# Patient Record
Sex: FEMALE | NOT HISPANIC OR LATINO | Employment: FULL TIME | URBAN - METROPOLITAN AREA
[De-identification: names, ages, dates, MRNs, and addresses within clinical notes are randomized per-mention and may not be internally consistent; named-entity substitution may affect disease eponyms.]

---

## 2023-02-25 PROBLEM — E55.9 VITAMIN D DEFICIENCY: Status: ACTIVE | Noted: 2023-02-25

## 2023-02-25 RX ORDER — ERGOCALCIFEROL 1.25 MG/1
1 CAPSULE ORAL
COMMUNITY
Start: 2022-05-25 | End: 2023-08-28 | Stop reason: SDUPTHER

## 2023-03-17 ENCOUNTER — OFFICE VISIT (OUTPATIENT)
Dept: PRIMARY CARE | Facility: CLINIC | Age: 48
End: 2023-03-17
Payer: COMMERCIAL

## 2023-03-17 VITALS
OXYGEN SATURATION: 96 % | HEART RATE: 91 BPM | WEIGHT: 231 LBS | DIASTOLIC BLOOD PRESSURE: 80 MMHG | RESPIRATION RATE: 16 BRPM | BODY MASS INDEX: 39.44 KG/M2 | HEIGHT: 64 IN | SYSTOLIC BLOOD PRESSURE: 127 MMHG

## 2023-03-17 DIAGNOSIS — N63.21 MASS OF UPPER OUTER QUADRANT OF LEFT BREAST: Primary | ICD-10-CM

## 2023-03-17 DIAGNOSIS — J01.00 ACUTE NON-RECURRENT MAXILLARY SINUSITIS: ICD-10-CM

## 2023-03-17 PROCEDURE — 1036F TOBACCO NON-USER: CPT | Performed by: FAMILY MEDICINE

## 2023-03-17 PROCEDURE — 99214 OFFICE O/P EST MOD 30 MIN: CPT | Performed by: FAMILY MEDICINE

## 2023-03-17 RX ORDER — AZITHROMYCIN 250 MG/1
TABLET, FILM COATED ORAL
Qty: 6 TABLET | Refills: 0 | Status: SHIPPED | OUTPATIENT
Start: 2023-03-17

## 2023-03-17 ASSESSMENT — ENCOUNTER SYMPTOMS
SINUS PAIN: 1
CHILLS: 0
SINUS PRESSURE: 1
COUGH: 0
FEVER: 0

## 2023-03-17 ASSESSMENT — PATIENT HEALTH QUESTIONNAIRE - PHQ9
2. FEELING DOWN, DEPRESSED OR HOPELESS: NOT AT ALL
SUM OF ALL RESPONSES TO PHQ9 QUESTIONS 1 AND 2: 0
1. LITTLE INTEREST OR PLEASURE IN DOING THINGS: NOT AT ALL

## 2023-03-17 ASSESSMENT — PAIN SCALES - GENERAL: PAINLEVEL: 0-NO PAIN

## 2023-03-17 NOTE — PROGRESS NOTES
"Subjective   Patient ID: Génesis Asencio is a 47 y.o. female who presents for Breast Problem (Left left side).    Breast Problem     Pt noticed lump on left breast in last month or so.  Not sure how long it has been there as she does not routinely check.  It has not changed size.  Not painful.  No trauma to the area.  Pt has not had a mammogram in the past.    Feels like she has a sinus infection.  Pressure, headache and drainage.     Review of Systems   Constitutional:  Negative for chills and fever.   HENT:  Positive for sinus pressure and sinus pain.    Respiratory:  Negative for cough.    Skin:         +breast lump       Objective   /80 (BP Location: Right arm, Patient Position: Sitting, BP Cuff Size: Large adult)   Pulse 91   Resp 16   Ht 1.626 m (5' 4\")   Wt 105 kg (231 lb)   SpO2 96%   BMI 39.65 kg/m²     Physical Exam  Constitutional:       Appearance: Normal appearance.   HENT:      Head: Normocephalic and atraumatic.      Right Ear: Tympanic membrane normal.      Left Ear: Tympanic membrane normal.      Nose: Nose normal.      Mouth/Throat:      Mouth: Mucous membranes are dry.   Eyes:      Pupils: Pupils are equal, round, and reactive to light.   Cardiovascular:      Rate and Rhythm: Normal rate and regular rhythm.   Pulmonary:      Effort: Pulmonary effort is normal.      Breath sounds: Normal breath sounds.   Neurological:      Mental Status: She is alert.     Breast:  Left breast there is a firm, nonmobile mass noted on the upper outer quadrant around 2oclock, measures about 2cm, no overlying skin abnormality    Assessment/Plan   Problem List Items Addressed This Visit          Infectious/Inflammatory    Acute non-recurrent maxillary sinusitis     Start Zpack  Recommend symptomatic treatment including increased hydration, regular nasal saline, use of analgesics such as acetaminophen/ibuprofen and nasal saline  Follow up if symptoms fail to improve          Relevant Medications    " azithromycin (Zithromax Z-Gilberto) 250 mg tablet       Other    Mass of upper outer quadrant of left breast - Primary     Concerning mass noted on exam  Orders for STAT mammogram, ultrasound placed  Referral to breast specialist also placed  Emphasized to pt importance of following up with the imaging study  Will follow up closely         Relevant Orders    BI mammo bilateral screening tomosynthesis    BI US breast complete left    Referral to Breast Surgery    BI mammo left diagnostic

## 2023-03-17 NOTE — ASSESSMENT & PLAN NOTE
Concerning mass noted on exam  Orders for STAT mammogram, ultrasound placed  Referral to breast specialist also placed  Emphasized to pt importance of following up with the imaging study  Will follow up closely

## 2023-03-17 NOTE — ASSESSMENT & PLAN NOTE
Start Zpack  Recommend symptomatic treatment including increased hydration, regular nasal saline, use of analgesics such as acetaminophen/ibuprofen and nasal saline  Follow up if symptoms fail to improve

## 2023-07-07 ENCOUNTER — APPOINTMENT (OUTPATIENT)
Dept: PRIMARY CARE | Facility: CLINIC | Age: 48
End: 2023-07-07
Payer: COMMERCIAL

## 2023-08-04 ENCOUNTER — APPOINTMENT (OUTPATIENT)
Dept: PRIMARY CARE | Facility: CLINIC | Age: 48
End: 2023-08-04
Payer: COMMERCIAL

## 2023-08-24 ASSESSMENT — PROMIS GLOBAL HEALTH SCALE
RATE_AVERAGE_PAIN: 4
CARRYOUT_PHYSICAL_ACTIVITIES: MOSTLY
CARRYOUT_SOCIAL_ACTIVITIES: GOOD
EMOTIONAL_PROBLEMS: SOMETIMES
RATE_AVERAGE_FATIGUE: SEVERE
RATE_MENTAL_HEALTH: GOOD
RATE_GENERAL_HEALTH: FAIR
RATE_SOCIAL_SATISFACTION: GOOD
RATE_PHYSICAL_HEALTH: FAIR
RATE_QUALITY_OF_LIFE: FAIR

## 2023-08-25 ENCOUNTER — OFFICE VISIT (OUTPATIENT)
Dept: PRIMARY CARE | Facility: CLINIC | Age: 48
End: 2023-08-25
Payer: COMMERCIAL

## 2023-08-25 VITALS
HEIGHT: 64 IN | OXYGEN SATURATION: 98 % | SYSTOLIC BLOOD PRESSURE: 124 MMHG | BODY MASS INDEX: 41.18 KG/M2 | TEMPERATURE: 98 F | WEIGHT: 241.2 LBS | DIASTOLIC BLOOD PRESSURE: 82 MMHG | HEART RATE: 86 BPM | RESPIRATION RATE: 18 BRPM

## 2023-08-25 DIAGNOSIS — Z00.00 HEALTH MAINTENANCE EXAMINATION: Primary | ICD-10-CM

## 2023-08-25 DIAGNOSIS — N63.21 MASS OF UPPER OUTER QUADRANT OF LEFT BREAST: ICD-10-CM

## 2023-08-25 LAB
ALANINE AMINOTRANSFERASE (SGPT) (U/L) IN SER/PLAS: 23 U/L (ref 7–45)
ALBUMIN (G/DL) IN SER/PLAS: 4.4 G/DL (ref 3.4–5)
ALKALINE PHOSPHATASE (U/L) IN SER/PLAS: 98 U/L (ref 33–110)
ANION GAP IN SER/PLAS: 15 MMOL/L (ref 10–20)
ASPARTATE AMINOTRANSFERASE (SGOT) (U/L) IN SER/PLAS: 14 U/L (ref 9–39)
BILIRUBIN TOTAL (MG/DL) IN SER/PLAS: 0.5 MG/DL (ref 0–1.2)
CALCIUM (MG/DL) IN SER/PLAS: 9.6 MG/DL (ref 8.6–10.6)
CARBON DIOXIDE, TOTAL (MMOL/L) IN SER/PLAS: 23 MMOL/L (ref 21–32)
CHLORIDE (MMOL/L) IN SER/PLAS: 104 MMOL/L (ref 98–107)
CHOLESTEROL (MG/DL) IN SER/PLAS: 190 MG/DL (ref 0–199)
CHOLESTEROL IN HDL (MG/DL) IN SER/PLAS: 41 MG/DL
CHOLESTEROL/HDL RATIO: 4.6
CREATININE (MG/DL) IN SER/PLAS: 0.51 MG/DL (ref 0.5–1.05)
ERYTHROCYTE DISTRIBUTION WIDTH (RATIO) BY AUTOMATED COUNT: 14.7 % (ref 11.5–14.5)
ERYTHROCYTE MEAN CORPUSCULAR HEMOGLOBIN CONCENTRATION (G/DL) BY AUTOMATED: 32.1 G/DL (ref 32–36)
ERYTHROCYTE MEAN CORPUSCULAR VOLUME (FL) BY AUTOMATED COUNT: 87 FL (ref 80–100)
ERYTHROCYTES (10*6/UL) IN BLOOD BY AUTOMATED COUNT: 4.53 X10E12/L (ref 4–5.2)
GFR FEMALE: >90 ML/MIN/1.73M2
GLUCOSE (MG/DL) IN SER/PLAS: 97 MG/DL (ref 74–99)
HEMATOCRIT (%) IN BLOOD BY AUTOMATED COUNT: 39.3 % (ref 36–46)
HEMOGLOBIN (G/DL) IN BLOOD: 12.6 G/DL (ref 12–16)
LDL: 132 MG/DL (ref 0–99)
LEUKOCYTES (10*3/UL) IN BLOOD BY AUTOMATED COUNT: 11.3 X10E9/L (ref 4.4–11.3)
NRBC (PER 100 WBCS) BY AUTOMATED COUNT: 0 /100 WBC (ref 0–0)
PLATELETS (10*3/UL) IN BLOOD AUTOMATED COUNT: 248 X10E9/L (ref 150–450)
POTASSIUM (MMOL/L) IN SER/PLAS: 3.9 MMOL/L (ref 3.5–5.3)
PROTEIN TOTAL: 7.2 G/DL (ref 6.4–8.2)
SODIUM (MMOL/L) IN SER/PLAS: 138 MMOL/L (ref 136–145)
TRIGLYCERIDE (MG/DL) IN SER/PLAS: 83 MG/DL (ref 0–149)
UREA NITROGEN (MG/DL) IN SER/PLAS: 10 MG/DL (ref 6–23)
VLDL: 17 MG/DL (ref 0–40)

## 2023-08-25 PROCEDURE — 80061 LIPID PANEL: CPT

## 2023-08-25 PROCEDURE — 85027 COMPLETE CBC AUTOMATED: CPT

## 2023-08-25 PROCEDURE — 82306 VITAMIN D 25 HYDROXY: CPT

## 2023-08-25 PROCEDURE — 1036F TOBACCO NON-USER: CPT | Performed by: FAMILY MEDICINE

## 2023-08-25 PROCEDURE — 99396 PREV VISIT EST AGE 40-64: CPT | Performed by: FAMILY MEDICINE

## 2023-08-25 PROCEDURE — 80053 COMPREHEN METABOLIC PANEL: CPT

## 2023-08-25 NOTE — PROGRESS NOTES
"Reason for Visit: Annual Physical Exam    HPI:    Presents for CPE, doing well, has no concerns  Did have a breast cyst about 4 months ago, states that it has resolved since that time, declines breast specialist follow up  Has no concerns  Experiences     Active Problem List  Patient Active Problem List   Diagnosis    Vitamin D deficiency    Mass of upper outer quadrant of left breast    Health maintenance examination       Comprehensive Medical/Surgical/Social/Family History  Past Medical History:   Diagnosis Date    Acute non-recurrent maxillary sinusitis 03/17/2023     No past surgical history on file.  Social History     Tobacco Use    Smoking status: Never     Passive exposure: Never    Smokeless tobacco: Never   Substance Use Topics    Alcohol use: Never    Drug use: Never     Family History   Problem Relation Name Age of Onset    Esophageal cancer Mother      Colon cancer Father      Colon cancer Maternal Grandmother      Diabetes Maternal Grandfather           Allergies and Medications  Patient has no known allergies.  Current Outpatient Medications on File Prior to Visit   Medication Sig Dispense Refill    azithromycin (Zithromax Z-Gilberto) 250 mg tablet Take 2 tablets on day 1, tablet 1 tablet day 2-5 (Patient not taking: Reported on 8/25/2023) 6 tablet 0    ergocalciferol (Vitamin D-2) 1.25 MG (62153 UT) capsule Take 1 capsule (1,250 mcg) by mouth 1 (one) time per week.       No current facility-administered medications on file prior to visit.         ROS otherwise negative aside from what was mentioned above in HPI.    Vitals  /82 (BP Location: Right arm, Patient Position: Sitting)   Pulse 86   Temp 36.7 °C (98 °F) (Oral)   Resp 18   Ht 1.626 m (5' 4.02\")   Wt 109 kg (241 lb 3.2 oz)   SpO2 98%   BMI 41.38 kg/m²   Body mass index is 41.38 kg/m².  Physical Exam  Gen: Alert, NAD  HEENT:  PERRL, EOMI, conjunctiva and sclera normal in appearance.   Neck:  Supple with FROM; No masses/nodes palpable; " Thyroid nontender and without nodules; No RHONDA  Respiratory:  Lungs CTAB  Cardiovascular:  Heart RRR. No M/R/G. Peripheral pulses equal bilaterally  Abdomen:  Soft, nontender, BS present throughout; No R/G/R; No HSM or masses palpated  Extremities:  FROM all extremities; Muscle strength grossly normal with good tone  Neuro:  CN II-XII intact; Gross motor and sensory intact  Skin:  No suspicious lesions present    Assessment and Plan:  Problem List Items Addressed This Visit       Mass of upper outer quadrant of left breast    Current Assessment & Plan     Normal cyst on imaging  Continue to encourage pt follow up with breast specialist but she declines  Emphasized importance of obtaining repeat follow up examination with mammogram         Health maintenance examination - Primary    Current Assessment & Plan     Recommended screening guidelines addressed and orders placed as indicated by age and chronic conditions  Screening labs ordered, will call with results  Encourage patient to get colon cancer screening, pt declines, aware of risks of foregoing screening  Continue to work on healthy lifestyle including well balanced diet, regular activity, limit alcohol, no tobacco products and safe sexual practices  Follow up annually           Relevant Orders    CBC (Completed)    Comprehensive Metabolic Panel (Completed)    Lipid Panel (Completed)    Vitamin D, Total (Completed)         Genia Ramachandran MD

## 2023-08-26 PROBLEM — Z00.00 HEALTH MAINTENANCE EXAMINATION: Status: ACTIVE | Noted: 2023-08-26

## 2023-08-26 PROBLEM — J01.00 ACUTE NON-RECURRENT MAXILLARY SINUSITIS: Status: RESOLVED | Noted: 2023-03-17 | Resolved: 2023-08-26

## 2023-08-26 LAB — CALCIDIOL (25 OH VITAMIN D3) (NG/ML) IN SER/PLAS: 9 NG/ML

## 2023-08-26 NOTE — ASSESSMENT & PLAN NOTE
Normal cyst on imaging  Continue to encourage pt follow up with breast specialist but she declines  Emphasized importance of obtaining repeat follow up examination with mammogram

## 2023-08-26 NOTE — ASSESSMENT & PLAN NOTE
Recommended screening guidelines addressed and orders placed as indicated by age and chronic conditions  Screening labs ordered, will call with results  Encourage patient to get colon cancer screening, pt declines, aware of risks of foregoing screening  Continue to work on healthy lifestyle including well balanced diet, regular activity, limit alcohol, no tobacco products and safe sexual practices  Follow up annually

## 2023-08-28 DIAGNOSIS — E55.9 VITAMIN D DEFICIENCY: Primary | ICD-10-CM

## 2023-08-28 RX ORDER — ERGOCALCIFEROL 1.25 MG/1
1 CAPSULE ORAL
Qty: 8 CAPSULE | Refills: 1 | Status: SHIPPED | OUTPATIENT
Start: 2023-08-28

## 2024-03-07 ENCOUNTER — HOSPITAL ENCOUNTER (OUTPATIENT)
Dept: RADIOLOGY | Facility: CLINIC | Age: 49
Discharge: HOME | End: 2024-03-07
Payer: COMMERCIAL

## 2024-03-07 ENCOUNTER — OFFICE VISIT (OUTPATIENT)
Dept: ORTHOPEDIC SURGERY | Facility: CLINIC | Age: 49
End: 2024-03-07
Payer: COMMERCIAL

## 2024-03-07 DIAGNOSIS — M25.561 ACUTE PAIN OF RIGHT KNEE: ICD-10-CM

## 2024-03-07 DIAGNOSIS — M17.11 ARTHRITIS OF RIGHT KNEE: ICD-10-CM

## 2024-03-07 DIAGNOSIS — M17.11 ARTHRITIS OF RIGHT KNEE: Primary | ICD-10-CM

## 2024-03-07 DIAGNOSIS — M25.561 RIGHT KNEE PAIN: ICD-10-CM

## 2024-03-07 PROCEDURE — L1812 KO ELASTIC W/JOINTS PRE OTS: HCPCS

## 2024-03-07 PROCEDURE — 73562 X-RAY EXAM OF KNEE 3: CPT | Mod: RT

## 2024-03-07 PROCEDURE — 20610 DRAIN/INJ JOINT/BURSA W/O US: CPT

## 2024-03-07 PROCEDURE — 99203 OFFICE O/P NEW LOW 30 MIN: CPT

## 2024-03-07 PROCEDURE — 1036F TOBACCO NON-USER: CPT

## 2024-03-07 RX ORDER — TRIAMCINOLONE ACETONIDE 40 MG/ML
40 INJECTION, SUSPENSION INTRA-ARTICULAR; INTRAMUSCULAR
Status: COMPLETED | OUTPATIENT
Start: 2024-03-07 | End: 2024-03-07

## 2024-03-07 RX ORDER — LIDOCAINE HYDROCHLORIDE 20 MG/ML
2 INJECTION, SOLUTION INFILTRATION; PERINEURAL
Status: COMPLETED | OUTPATIENT
Start: 2024-03-07 | End: 2024-03-07

## 2024-03-07 RX ADMIN — LIDOCAINE HYDROCHLORIDE 2 ML: 20 INJECTION, SOLUTION INFILTRATION; PERINEURAL at 12:12

## 2024-03-07 RX ADMIN — TRIAMCINOLONE ACETONIDE 40 MG: 40 INJECTION, SUSPENSION INTRA-ARTICULAR; INTRAMUSCULAR at 12:12

## 2024-03-07 NOTE — PROGRESS NOTES
Subjective    Patient ID: Génesis Asencio is a 48 y.o. female.    Chief Complaint: Pain of the Right Knee    HPI  This is a pleasant 48-year-old female presenting to the walk-in injury clinic for evaluation of right knee pain, which has been ongoing for 6 weeks.  No known injury.  She points to anterior aspect of knee and medial aspect when describing the pain.  Pain is described as a constant dull ache, worse with activities of daily living including standing walking and stair climbing.  She experiences pain and stiffness after prolonged sitting.  It is difficult for her to kneel and get up from a sitting position.  She has noticed limited range of motion, cracking/popping, and occasional mechanical symptoms of knee giving out or buckling.  The pain awakens her at night.  Has been limping due to the pain.  She has not noted any redness or warmth.  She takes an occasional Aleve without significant relief.  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    The patient's past medical, surgical, family, and social history as well as allergies and medications were reviewed and updated in the chart.    Objective   Ortho Exam  Pleasant and no acute distress. Walks with antalgic gait. Stands with varus alignment of both knees.  Bilateral knees appearing without soft tissue swelling erythema or ecchymosis.  There is no warmth upon touch.  Patellofemoral crepitus noted with range of motion testing.  Right knee range of motion is 3-110°. The knee is stable to varus and valgus stress Lachman and posterior drawer.  Art's is positive with pain medially.  There is a mild effusion. There is generalized tenderness, but most focal to medial joint line. Left knee range of motion is 3-120°. There is no effusion. The knee is stable to varus and valgus stress Lachman and posterior drawer. Both lower extremities are well perfused the skin is intact and muscle tone is adequate.    Image Results:  Multiple view  x-rays of the right knee obtained today personally reviewed, without evidence of acute fracture or dislocation.  There is evidence of mild to moderate degenerative changes of both knees, there is joint space narrowing in medial and patellofemoral compartments.    L Inj/Asp: R knee on 3/7/2024 12:12 PM  Indications: pain  Details: 22 G needle, superolateral approach  Medications: 40 mg triamcinolone acetonide 40 mg/mL; 2 mL lidocaine 20 mg/mL (2 %)  Procedure, treatment alternatives, risks and benefits explained, specific risks discussed. Consent was given by the patient.          Assessment/Plan   Encounter Diagnoses: Right knee pain, right knee arthritis, concern for degenerative medial meniscus tear    Plan: Discussion with patient about diagnosis with review of today's x-rays.  Discussion about right knee arthritis was done.  Conservative treatment options were discussed at length.  After the risks and benefits of a right knee intra-articular steroid injection of Kenalog/lidocaine were discussed, patient agreed to injection and tolerated well.  She is aware that injection could take up to 2 weeks to take full effect.  She can continue to take anti-inflammatories and use ice application.  I have prescribed her an economy hinged brace to help with right knee stability.  She should avoid aggravating activities.  If she does not see significant relief of symptoms in 4 to 6 weeks, an MRI of the right knee could be indicated to assess for a medial meniscus tear.  She will follow-up as needed.

## 2024-03-25 DIAGNOSIS — M25.561 ACUTE PAIN OF RIGHT KNEE: Primary | ICD-10-CM

## 2024-04-04 ENCOUNTER — TELEPHONE (OUTPATIENT)
Dept: ORTHOPEDIC SURGERY | Facility: CLINIC | Age: 49
End: 2024-04-04
Payer: COMMERCIAL

## 2024-04-09 ENCOUNTER — APPOINTMENT (OUTPATIENT)
Dept: RADIOLOGY | Facility: CLINIC | Age: 49
End: 2024-04-09
Payer: COMMERCIAL

## 2024-04-10 NOTE — TELEPHONE ENCOUNTER
PLACED CALL TO PT AND GAVE HER THE RESPONSE. SHE STATED THE INJECTION HELPED AND IS GOING TO SEE HOW IT LASTS.

## 2024-04-19 ENCOUNTER — TELEPHONE (OUTPATIENT)
Dept: ORTHOPEDIC SURGERY | Facility: CLINIC | Age: 49
End: 2024-04-19
Payer: COMMERCIAL

## 2024-04-19 NOTE — TELEPHONE ENCOUNTER
Left  a voicemail to remind the pt. to please bring her MRI disc to her appointment with Dr. Wong and to call back if there were any questions.

## 2024-04-26 ENCOUNTER — APPOINTMENT (OUTPATIENT)
Dept: ORTHOPEDIC SURGERY | Facility: CLINIC | Age: 49
End: 2024-04-26
Payer: COMMERCIAL

## 2024-04-26 ENCOUNTER — OFFICE VISIT (OUTPATIENT)
Dept: ORTHOPEDIC SURGERY | Facility: CLINIC | Age: 49
End: 2024-04-26
Payer: COMMERCIAL

## 2024-04-26 DIAGNOSIS — S83.231A COMPLEX TEAR OF MEDIAL MENISCUS OF RIGHT KNEE AS CURRENT INJURY, INITIAL ENCOUNTER: Primary | ICD-10-CM

## 2024-04-26 PROCEDURE — 1036F TOBACCO NON-USER: CPT | Performed by: ORTHOPAEDIC SURGERY

## 2024-04-26 PROCEDURE — 99214 OFFICE O/P EST MOD 30 MIN: CPT | Performed by: ORTHOPAEDIC SURGERY

## 2024-04-26 RX ORDER — SODIUM CHLORIDE, SODIUM LACTATE, POTASSIUM CHLORIDE, CALCIUM CHLORIDE 600; 310; 30; 20 MG/100ML; MG/100ML; MG/100ML; MG/100ML
100 INJECTION, SOLUTION INTRAVENOUS CONTINUOUS
OUTPATIENT
Start: 2024-04-26

## 2024-04-26 RX ORDER — CEFAZOLIN SODIUM 2 G/100ML
2 INJECTION, SOLUTION INTRAVENOUS ONCE
OUTPATIENT
Start: 2024-04-26 | End: 2024-04-26

## 2024-04-26 NOTE — PROGRESS NOTES
48-year-old is seen with right knee pain.  She has been having intermittent severe sharp shooting pain in the right knee.  Pain is worse with standing and walking.  There is locking and swelling and giving way.  She has been having pain since January.  She had an injection that gave her some relief.  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    Pleasant and in no acute distress. Ambulates with a mildly antalgic gait.  Right knee range of motion is 3-120. There is a mild effusion and no instability. The knee is stable to varus and valgus stress Lachman and posterior drawer. There is medial joint line tenderness Art's is positive with pain medially.  The Left knee range of motion is 0-130 without effusion or instability. There is no tenderness around the left knee. Bilateral lower extremities are well-perfused the skin is intact and muscle tone is adequate.  There is adequate range of motion of his hips without significant pain.    Multiple x-ray views of the right knee are personally reviewed and there is mild medial compartment narrowing.    MRI of the right knee is personally reviewed and there is complex tearing involving the posterior horn of the medial meniscus.  There are chondral changes involving the medial and patellofemoral compartments.    A detailed discussion about her knee and the meniscus tear was done.  Treatment options including no treatment were reviewed.  Realistic expectations and limitations of arthroscopy were discussed.  Decision was made to proceed with right knee arthroscopy and partial medial meniscectomy.  The surgery and postoperative course reviewed in detail.  Risks including but not limited to infection thromboembolus neurovascular injury medical problems and stiffness were reviewed and she understands this and has elected to proceed.

## 2024-05-06 ENCOUNTER — HOSPITAL ENCOUNTER (OUTPATIENT)
Dept: RADIOLOGY | Facility: EXTERNAL LOCATION | Age: 49
Discharge: HOME | End: 2024-05-06
Payer: COMMERCIAL

## 2024-06-05 PROBLEM — S83.209A CURRENT TEAR OF SEMILUNAR CARTILAGE: Status: ACTIVE | Noted: 2024-04-26

## 2024-06-26 ENCOUNTER — PRE-ADMISSION TESTING (OUTPATIENT)
Dept: PREADMISSION TESTING | Facility: HOSPITAL | Age: 49
End: 2024-06-26
Payer: COMMERCIAL

## 2024-06-26 VITALS
OXYGEN SATURATION: 98 % | WEIGHT: 242.51 LBS | BODY MASS INDEX: 41.4 KG/M2 | HEART RATE: 84 BPM | RESPIRATION RATE: 16 BRPM | SYSTOLIC BLOOD PRESSURE: 134 MMHG | TEMPERATURE: 96.3 F | HEIGHT: 64 IN | DIASTOLIC BLOOD PRESSURE: 84 MMHG

## 2024-06-26 DIAGNOSIS — Z01.818 PREOP TESTING: Primary | ICD-10-CM

## 2024-06-26 LAB
ANION GAP SERPL CALC-SCNC: 15 MMOL/L (ref 10–20)
ATRIAL RATE: 70 BPM
BUN SERPL-MCNC: 8 MG/DL (ref 6–23)
CALCIUM SERPL-MCNC: 9.2 MG/DL (ref 8.6–10.3)
CHLORIDE SERPL-SCNC: 105 MMOL/L (ref 98–107)
CO2 SERPL-SCNC: 23 MMOL/L (ref 21–32)
CREAT SERPL-MCNC: 0.6 MG/DL (ref 0.5–1.05)
EGFRCR SERPLBLD CKD-EPI 2021: >90 ML/MIN/1.73M*2
ERYTHROCYTE [DISTWIDTH] IN BLOOD BY AUTOMATED COUNT: 14.5 % (ref 11.5–14.5)
EST. AVERAGE GLUCOSE BLD GHB EST-MCNC: 114 MG/DL
GLUCOSE SERPL-MCNC: 127 MG/DL (ref 74–99)
HBA1C MFR BLD: 5.6 %
HCT VFR BLD AUTO: 40.1 % (ref 36–46)
HGB BLD-MCNC: 13.3 G/DL (ref 12–16)
MCH RBC QN AUTO: 28.1 PG (ref 26–34)
MCHC RBC AUTO-ENTMCNC: 33.2 G/DL (ref 32–36)
MCV RBC AUTO: 85 FL (ref 80–100)
NRBC BLD-RTO: 0 /100 WBCS (ref 0–0)
P AXIS: 22 DEGREES
P OFFSET: 193 MS
P ONSET: 143 MS
PLATELET # BLD AUTO: 281 X10*3/UL (ref 150–450)
POTASSIUM SERPL-SCNC: 3.5 MMOL/L (ref 3.5–5.3)
PR INTERVAL: 170 MS
Q ONSET: 228 MS
QRS COUNT: 12 BEATS
QRS DURATION: 90 MS
QT INTERVAL: 386 MS
QTC CALCULATION(BAZETT): 416 MS
QTC FREDERICIA: 406 MS
R AXIS: -1 DEGREES
RBC # BLD AUTO: 4.73 X10*6/UL (ref 4–5.2)
SODIUM SERPL-SCNC: 139 MMOL/L (ref 136–145)
T AXIS: 6 DEGREES
T OFFSET: 421 MS
VENTRICULAR RATE: 70 BPM
WBC # BLD AUTO: 10 X10*3/UL (ref 4.4–11.3)

## 2024-06-26 PROCEDURE — 85027 COMPLETE CBC AUTOMATED: CPT

## 2024-06-26 PROCEDURE — 93005 ELECTROCARDIOGRAM TRACING: CPT

## 2024-06-26 PROCEDURE — 83036 HEMOGLOBIN GLYCOSYLATED A1C: CPT

## 2024-06-26 PROCEDURE — 80048 BASIC METABOLIC PNL TOTAL CA: CPT

## 2024-06-26 PROCEDURE — 36415 COLL VENOUS BLD VENIPUNCTURE: CPT

## 2024-06-26 PROCEDURE — 93010 ELECTROCARDIOGRAM REPORT: CPT | Performed by: INTERNAL MEDICINE

## 2024-06-26 ASSESSMENT — DUKE ACTIVITY SCORE INDEX (DASI)
CAN YOU DO HEAVY WORK AROUND THE HOUSE LIKE SCRUBBING FLOORS OR LIFTING AND MOVING HEAVY FURNITURE: NO
CAN YOU PARTICIPATE IN STRENOUS SPORTS LIKE SWIMMING, SINGLES TENNIS, FOOTBALL, BASKETBALL, OR SKIING: NO
CAN YOU CLIMB A FLIGHT OF STAIRS OR WALK UP A HILL: YES
CAN YOU WALK INDOORS, SUCH AS AROUND YOUR HOUSE: YES
CAN YOU TAKE CARE OF YOURSELF (EAT, DRESS, BATHE, OR USE TOILET): YES
CAN YOU PARTICIPATE IN MODERATE RECREATIONAL ACTIVITIES LIKE GOLF, BOWLING, DANCING, DOUBLES TENNIS OR THROWING A BASEBALL OR FOOTBALL: NO
CAN YOU RUN A SHORT DISTANCE: NO
TOTAL_SCORE: 24.2
CAN YOU DO YARD WORK LIKE RAKING LEAVES, WEEDING OR PUSHING A MOWER: NO
CAN YOU DO LIGHT WORK AROUND THE HOUSE LIKE DUSTING OR WASHING DISHES: YES
CAN YOU HAVE SEXUAL RELATIONS: YES
DASI METS SCORE: 5.7
CAN YOU DO MODERATE WORK AROUND THE HOUSE LIKE VACUUMING, SWEEPING FLOORS OR CARRYING GROCERIES: YES
CAN YOU WALK A BLOCK OR TWO ON LEVEL GROUND: YES

## 2024-06-26 NOTE — CPM/PAT H&P
CPM/PAT Evaluation       Name: Génesis Asencio (Génesis Asencio)  /Age: 1975/49 y.o.     In-Person       Chief Complaint: Right knee pain    49 yr old female with c/o Right knee pain.  States no specific injury, just that knee began to hurt in January of this year.  Reports ongoing progressive pain worsened by activity including walking, standing and stairs.  Pain is constant dull ache w/intermittent sharp, shooting along with swelling, knee popping and instability; denies falls.  Has tried injection, rest and elevation with no lasting relief. Currently wearing a knee hinged brace for comfort and stability.  Reports remaining otherwise physically active, denies cardiac or respiratory symptoms.  No previous general anesthesia.    Followed by PCP (Madie) - last visit 2023         Past Medical History:   Diagnosis Date    Acute non-recurrent maxillary sinusitis 2023       No past surgical history on file.    Patient  has no history on file for sexual activity.    Family History   Problem Relation Name Age of Onset    Esophageal cancer Mother      Colon cancer Father      Colon cancer Maternal Grandmother      Diabetes Maternal Grandfather         No Known Allergies    Prior to Admission medications    Medication Sig Start Date End Date Taking? Authorizing Provider   azithromycin (Zithromax Z-Gilberto) 250 mg tablet Take 2 tablets on day 1, tablet 1 tablet day 2-5  Patient not taking: Reported on 2023 3/17/23   Genia Ramachandran MD   ergocalciferol (Vitamin D-2) 1.25 MG (68950 UT) capsule Take 1 capsule (1,250 mcg) by mouth 1 (one) time per week. 23   Genia Ramachandran MD        Review of Systems    Constitutional: no fever, no chills and not feeling poorly.   Eyes: no eyesight problems.   ENT: no hearing loss, no nosebleeds and no sore throat.   Cardiovascular: no chest pain, no palpitations and no extremity edema.   Respiratory: no shortness of breath, no wheezing, no cough, + sob w/exertion.    Gastrointestinal: negative for abdominal pain, blood in stools or changes in bowel habits   Genitourinary: negative for dysuria, incontinence or changes in urinary habits   Musculoskeletal: see HPI   Integumentary: negative for lesions, rash or itching.   Neurological: negative for confusion, dizziness or fainting.   Psychiatric: not suicidal, no anxiety and no depression.   All other systems have been reviewed and are negative for complaint.     Physical Exam  Constitutional:       General: No acute distress.     Aox3, pleasant and cooperative, appropriate mood and eye contact   HENT:      Head: Normocephalic.      Mouth/Throat: Mucous membranes moist & pink  Eyes:      Vision grossly intact, PERRLA, corrective lenses in use  Neck:      No carotid bruit, no JVD  Cardiovascular:      RRR, S1S2, no murmurs, rubs or gallops  Pulmonary:      Symmetric chest expansion, CTA, Room Air  Abdominal:      Soft non-tender, BSx4   Skin:     Warm, dry & intact   Extremities:      No gross deformities; abnormal gait, hinged knee brace to Right in use  Neurological:      No focal deficit, Aox3, MYERS x4  Psychiatric:      Pleasant & cooperative, appropriate affect    PAT AIRWAY:   Airway:     Mallampati::  II    TM distance::  >3 FB    Neck ROM::  Full   upper dentures      There were no vitals taken for this visit.    DASI Risk Score    No data to display       Caprini DVT Assessment    No data to display       Modified Frailty Index    No data to display       CHADS2 Stroke Risk  Current as of 31 minutes ago        N/A 3 to 100%: High Risk   2 to < 3%: Medium Risk   0 to < 2%: Low Risk     Last Change: N/A          This score determines the patient's risk of having a stroke if the patient has atrial fibrillation.        This score is not applicable to this patient. Components are not calculated.          Revised Cardiac Risk Index    No data to display       Apfel Simplified Score    No data to display       Risk Analysis Index  Results This Encounter    No data found in the last 1 encounters.         Assessment and Plan:     Followed by ortho, Complex tear of Right knee medial meniscus    Right knee arthroscopy w/partial medial meniscectomy w/Dr Wong on 7/11/2024    Reviewed todays ecg

## 2024-06-26 NOTE — H&P (VIEW-ONLY)
CPM/PAT Evaluation       Name: Génesis Asencio (Génesis Asencio)  /Age: 1975/49 y.o.     In-Person       Chief Complaint: Right knee pain    49 yr old female with c/o Right knee pain.  States no specific injury, just that knee began to hurt in January of this year.  Reports ongoing progressive pain worsened by activity including walking, standing and stairs.  Pain is constant dull ache w/intermittent sharp, shooting along with swelling, knee popping and instability; denies falls.  Has tried injection, rest and elevation with no lasting relief. Currently wearing a knee hinged brace for comfort and stability.  Reports remaining otherwise physically active, denies cardiac or respiratory symptoms.  No previous general anesthesia.    Followed by PCP (Madie) - last visit 2023         Past Medical History:   Diagnosis Date    Acute non-recurrent maxillary sinusitis 2023       No past surgical history on file.    Patient  has no history on file for sexual activity.    Family History   Problem Relation Name Age of Onset    Esophageal cancer Mother      Colon cancer Father      Colon cancer Maternal Grandmother      Diabetes Maternal Grandfather         No Known Allergies    Prior to Admission medications    Medication Sig Start Date End Date Taking? Authorizing Provider   azithromycin (Zithromax Z-Gilberto) 250 mg tablet Take 2 tablets on day 1, tablet 1 tablet day 2-5  Patient not taking: Reported on 2023 3/17/23   Genia Ramachandran MD   ergocalciferol (Vitamin D-2) 1.25 MG (67733 UT) capsule Take 1 capsule (1,250 mcg) by mouth 1 (one) time per week. 23   Genia Ramachandran MD        Review of Systems    Constitutional: no fever, no chills and not feeling poorly.   Eyes: no eyesight problems.   ENT: no hearing loss, no nosebleeds and no sore throat.   Cardiovascular: no chest pain, no palpitations and no extremity edema.   Respiratory: no shortness of breath, no wheezing, no cough, + sob w/exertion.    Gastrointestinal: negative for abdominal pain, blood in stools or changes in bowel habits   Genitourinary: negative for dysuria, incontinence or changes in urinary habits   Musculoskeletal: see HPI   Integumentary: negative for lesions, rash or itching.   Neurological: negative for confusion, dizziness or fainting.   Psychiatric: not suicidal, no anxiety and no depression.   All other systems have been reviewed and are negative for complaint.     Physical Exam  Constitutional:       General: No acute distress.     Aox3, pleasant and cooperative, appropriate mood and eye contact   HENT:      Head: Normocephalic.      Mouth/Throat: Mucous membranes moist & pink  Eyes:      Vision grossly intact, PERRLA, corrective lenses in use  Neck:      No carotid bruit, no JVD  Cardiovascular:      RRR, S1S2, no murmurs, rubs or gallops  Pulmonary:      Symmetric chest expansion, CTA, Room Air  Abdominal:      Soft non-tender, BSx4   Skin:     Warm, dry & intact   Extremities:      No gross deformities; abnormal gait, hinged knee brace to Right in use  Neurological:      No focal deficit, Aox3, MYERS x4  Psychiatric:      Pleasant & cooperative, appropriate affect    PAT AIRWAY:   Airway:     Mallampati::  II    TM distance::  >3 FB    Neck ROM::  Full   upper dentures      There were no vitals taken for this visit.    DASI Risk Score    No data to display       Caprini DVT Assessment    No data to display       Modified Frailty Index    No data to display       CHADS2 Stroke Risk  Current as of 31 minutes ago        N/A 3 to 100%: High Risk   2 to < 3%: Medium Risk   0 to < 2%: Low Risk     Last Change: N/A          This score determines the patient's risk of having a stroke if the patient has atrial fibrillation.        This score is not applicable to this patient. Components are not calculated.          Revised Cardiac Risk Index    No data to display       Apfel Simplified Score    No data to display       Risk Analysis Index  Results This Encounter    No data found in the last 1 encounters.         Assessment and Plan:     Followed by ortho, Complex tear of Right knee medial meniscus    Right knee arthroscopy w/partial medial meniscectomy w/Dr Wong on 7/11/2024    Reviewed todays ecg

## 2024-06-26 NOTE — PREPROCEDURE INSTRUCTIONS
Medication List            Accurate as of June 26, 2024  9:49 AM. Always use your most recent med list.                ergocalciferol 1.25 MG (12705 UT) capsule  Commonly known as: Vitamin D-2  Take 1 capsule (1,250 mcg) by mouth 1 (one) time per week.  Medication Adjustments for Surgery: Stop 7 days before surgery            One of our staff members will call you one business day before your surgery between 12-4pm to let you know the time to arrive at the hospital. If you have not received a phone call by 2pm call [phone].     When you arrive at the hospital, go to Registration on the ground floor.   You will need a responsible adult to drive you home.     Prior to surgery date:  One (1) week prior to surgery STOP:  -Stop aspirin products. Do not take NSAIDS/ Ibuprofen, Aleve, Motrin. Okay to take Tylenol or Acetaminophen. Do not take vitamins, supplements, herbals, diet pills.   -Stop these medications: __________________________________________________________  -No alcohol for 24 hours prior to surgery.  -No smoking 24 hours prior. No Marijuana, CBD oil, or Vaping 48 hours prior to surgery.  -Enjoy a light supper the evening before surgery.    Day of Surgery:  -Nothing to eat or drink after midnight. This includes food of any kind (including hard candy, cough drops, mints and gum, coffee).   -No acrylic nails or nail polish on at least one fingernail; no polish on toes for foot surgery.   -No body piercing or jewelry.   -Shower as directed. No deodorant, lotion, power, oils, perfume, make-up.   -Wear loose comfortable clothing to accommodate your bandages.       -Bring urine specimen if directed  -Bring crutches/ walker if directed           NPO Instructions:    Do not eat any food after midnight the night before your surgery/procedure.    Additional Instructions:     The Day before Surgery:  Review your medication instructions, stop indicated medications  You will be contacted regarding the time of your  arrival to facility and surgery time

## 2024-06-28 ENCOUNTER — APPOINTMENT (OUTPATIENT)
Dept: PRIMARY CARE | Facility: CLINIC | Age: 49
End: 2024-06-28
Payer: COMMERCIAL

## 2024-07-01 LAB
ATRIAL RATE: 70 BPM
P AXIS: 22 DEGREES
P OFFSET: 193 MS
P ONSET: 143 MS
PR INTERVAL: 170 MS
Q ONSET: 228 MS
QRS COUNT: 12 BEATS
QRS DURATION: 90 MS
QT INTERVAL: 386 MS
QTC CALCULATION(BAZETT): 416 MS
QTC FREDERICIA: 406 MS
R AXIS: -1 DEGREES
T AXIS: 6 DEGREES
T OFFSET: 421 MS
VENTRICULAR RATE: 70 BPM

## 2024-07-10 ENCOUNTER — ANESTHESIA EVENT (OUTPATIENT)
Dept: OPERATING ROOM | Facility: HOSPITAL | Age: 49
End: 2024-07-10
Payer: COMMERCIAL

## 2024-07-10 PROBLEM — E66.01 CLASS 3 SEVERE OBESITY IN ADULT (MULTI): Status: ACTIVE | Noted: 2024-07-10

## 2024-07-10 PROBLEM — E66.813 CLASS 3 SEVERE OBESITY IN ADULT: Status: ACTIVE | Noted: 2024-07-10

## 2024-07-10 SDOH — HEALTH STABILITY: MENTAL HEALTH: CURRENT SMOKER: 0

## 2024-07-10 NOTE — ANESTHESIA PREPROCEDURE EVALUATION
Patient: Génesis Asencio    Procedure Information       Date/Time: 07/11/24 5490    Procedure: RIGHT KNEE ARTHROSCOPY WITH PARTIAL MEDIAL MENISCECTOMY (Right: Knee)    Location: [facility] 07 / [facility]    Surgeons: Guy oWng MD            Relevant Problems   Endocrine   (+) Class 3 severe obesity in adult (Multi)       Clinical information reviewed:      Problems              NPO Detail:  No data recorded     Physical Exam    Airway  Mallampati: II  TM distance: <3 FB  Neck ROM: full     Cardiovascular - normal exam  Rhythm: regular  Rate: normal     Dental - normal exam  (+) upper dentures     Pulmonary - normal exam     Abdominal   (+) obese             Anesthesia Plan    History of general anesthesia?: no  History of complications of general anesthesia?: unknown/emergency    ASA 3     general     The patient is not a current smoker.  Education provided regarding risk of obstructive sleep apnea.  intravenous induction   Postoperative administration of opioids is intended.  Trial extubation is planned.  Anesthetic plan and risks discussed with patient.  Use of blood products discussed with patient who consented to blood products.    Plan discussed with CRNA.

## 2024-07-11 ENCOUNTER — HOSPITAL ENCOUNTER (OUTPATIENT)
Facility: HOSPITAL | Age: 49
Setting detail: OUTPATIENT SURGERY
Discharge: HOME | End: 2024-07-11
Attending: ORTHOPAEDIC SURGERY | Admitting: ORTHOPAEDIC SURGERY
Payer: COMMERCIAL

## 2024-07-11 ENCOUNTER — ANESTHESIA (OUTPATIENT)
Dept: OPERATING ROOM | Facility: HOSPITAL | Age: 49
End: 2024-07-11
Payer: COMMERCIAL

## 2024-07-11 VITALS
RESPIRATION RATE: 16 BRPM | BODY MASS INDEX: 41.63 KG/M2 | WEIGHT: 242.51 LBS | DIASTOLIC BLOOD PRESSURE: 69 MMHG | OXYGEN SATURATION: 97 % | TEMPERATURE: 97.2 F | SYSTOLIC BLOOD PRESSURE: 139 MMHG | HEART RATE: 85 BPM

## 2024-07-11 DIAGNOSIS — S83.231A COMPLEX TEAR OF MEDIAL MENISCUS, CURRENT INJURY, RIGHT KNEE, INITIAL ENCOUNTER: Primary | ICD-10-CM

## 2024-07-11 DIAGNOSIS — S83.231A COMPLEX TEAR OF MEDIAL MENISCUS OF RIGHT KNEE AS CURRENT INJURY, INITIAL ENCOUNTER: ICD-10-CM

## 2024-07-11 LAB — PREGNANCY TEST URINE, POC: NEGATIVE

## 2024-07-11 PROCEDURE — 2500000004 HC RX 250 GENERAL PHARMACY W/ HCPCS (ALT 636 FOR OP/ED): Performed by: NURSE ANESTHETIST, CERTIFIED REGISTERED

## 2024-07-11 PROCEDURE — 3600000004 HC OR TIME - INITIAL BASE CHARGE - PROCEDURE LEVEL FOUR: Performed by: ORTHOPAEDIC SURGERY

## 2024-07-11 PROCEDURE — 3700000002 HC GENERAL ANESTHESIA TIME - EACH INCREMENTAL 1 MINUTE: Performed by: ORTHOPAEDIC SURGERY

## 2024-07-11 PROCEDURE — 2500000004 HC RX 250 GENERAL PHARMACY W/ HCPCS (ALT 636 FOR OP/ED): Performed by: ORTHOPAEDIC SURGERY

## 2024-07-11 PROCEDURE — 7100000010 HC PHASE TWO TIME - EACH INCREMENTAL 1 MINUTE: Performed by: ORTHOPAEDIC SURGERY

## 2024-07-11 PROCEDURE — 3700000001 HC GENERAL ANESTHESIA TIME - INITIAL BASE CHARGE: Performed by: ORTHOPAEDIC SURGERY

## 2024-07-11 PROCEDURE — 7100000001 HC RECOVERY ROOM TIME - INITIAL BASE CHARGE: Performed by: ORTHOPAEDIC SURGERY

## 2024-07-11 PROCEDURE — 2500000005 HC RX 250 GENERAL PHARMACY W/O HCPCS: Performed by: NURSE ANESTHETIST, CERTIFIED REGISTERED

## 2024-07-11 PROCEDURE — 2500000004 HC RX 250 GENERAL PHARMACY W/ HCPCS (ALT 636 FOR OP/ED): Performed by: ANESTHESIOLOGY

## 2024-07-11 PROCEDURE — 29881 ARTHRS KNE SRG MNISECTMY M/L: CPT | Performed by: ORTHOPAEDIC SURGERY

## 2024-07-11 PROCEDURE — 7100000009 HC PHASE TWO TIME - INITIAL BASE CHARGE: Performed by: ORTHOPAEDIC SURGERY

## 2024-07-11 PROCEDURE — 2720000007 HC OR 272 NO HCPCS: Performed by: ORTHOPAEDIC SURGERY

## 2024-07-11 PROCEDURE — 81025 URINE PREGNANCY TEST: CPT | Performed by: ORTHOPAEDIC SURGERY

## 2024-07-11 PROCEDURE — 7100000002 HC RECOVERY ROOM TIME - EACH INCREMENTAL 1 MINUTE: Performed by: ORTHOPAEDIC SURGERY

## 2024-07-11 PROCEDURE — 3600000009 HC OR TIME - EACH INCREMENTAL 1 MINUTE - PROCEDURE LEVEL FOUR: Performed by: ORTHOPAEDIC SURGERY

## 2024-07-11 PROCEDURE — 2500000005 HC RX 250 GENERAL PHARMACY W/O HCPCS: Performed by: ORTHOPAEDIC SURGERY

## 2024-07-11 RX ORDER — GLYCOPYRROLATE 0.2 MG/ML
INJECTION INTRAMUSCULAR; INTRAVENOUS AS NEEDED
Status: DISCONTINUED | OUTPATIENT
Start: 2024-07-11 | End: 2024-07-11

## 2024-07-11 RX ORDER — IBUPROFEN 600 MG/1
600 TABLET ORAL EVERY 6 HOURS PRN
Qty: 60 TABLET | Refills: 0 | Status: SHIPPED | OUTPATIENT
Start: 2024-07-11 | End: 2024-07-21

## 2024-07-11 RX ORDER — ALBUTEROL SULFATE 0.83 MG/ML
2.5 SOLUTION RESPIRATORY (INHALATION) ONCE AS NEEDED
Status: DISCONTINUED | OUTPATIENT
Start: 2024-07-11 | End: 2024-07-11 | Stop reason: HOSPADM

## 2024-07-11 RX ORDER — OXYCODONE HYDROCHLORIDE 5 MG/1
5 TABLET ORAL EVERY 6 HOURS PRN
Qty: 10 TABLET | Refills: 0 | Status: SHIPPED | OUTPATIENT
Start: 2024-07-11 | End: 2024-07-18

## 2024-07-11 RX ORDER — ACETAMINOPHEN 325 MG/1
650 TABLET ORAL EVERY 4 HOURS PRN
Status: DISCONTINUED | OUTPATIENT
Start: 2024-07-11 | End: 2024-07-11 | Stop reason: HOSPADM

## 2024-07-11 RX ORDER — CEFAZOLIN SODIUM 2 G/100ML
2 INJECTION, SOLUTION INTRAVENOUS ONCE
Status: COMPLETED | OUTPATIENT
Start: 2024-07-11 | End: 2024-07-11

## 2024-07-11 RX ORDER — PROPOFOL 10 MG/ML
INJECTION, EMULSION INTRAVENOUS AS NEEDED
Status: DISCONTINUED | OUTPATIENT
Start: 2024-07-11 | End: 2024-07-11

## 2024-07-11 RX ORDER — FENTANYL CITRATE 50 UG/ML
INJECTION, SOLUTION INTRAMUSCULAR; INTRAVENOUS AS NEEDED
Status: DISCONTINUED | OUTPATIENT
Start: 2024-07-11 | End: 2024-07-11

## 2024-07-11 RX ORDER — LIDOCAINE HYDROCHLORIDE 10 MG/ML
0.1 INJECTION INFILTRATION; PERINEURAL ONCE
Status: DISCONTINUED | OUTPATIENT
Start: 2024-07-11 | End: 2024-07-11 | Stop reason: HOSPADM

## 2024-07-11 RX ORDER — KETAMINE HCL IN NACL, ISO-OSM 100MG/10ML
SYRINGE (ML) INJECTION AS NEEDED
Status: DISCONTINUED | OUTPATIENT
Start: 2024-07-11 | End: 2024-07-11

## 2024-07-11 RX ORDER — MIDAZOLAM HYDROCHLORIDE 1 MG/ML
INJECTION, SOLUTION INTRAMUSCULAR; INTRAVENOUS AS NEEDED
Status: DISCONTINUED | OUTPATIENT
Start: 2024-07-11 | End: 2024-07-11

## 2024-07-11 RX ORDER — MEPERIDINE HYDROCHLORIDE 25 MG/ML
12.5 INJECTION INTRAMUSCULAR; INTRAVENOUS; SUBCUTANEOUS EVERY 10 MIN PRN
Status: DISCONTINUED | OUTPATIENT
Start: 2024-07-11 | End: 2024-07-11 | Stop reason: HOSPADM

## 2024-07-11 RX ORDER — ONDANSETRON HYDROCHLORIDE 2 MG/ML
INJECTION, SOLUTION INTRAVENOUS AS NEEDED
Status: DISCONTINUED | OUTPATIENT
Start: 2024-07-11 | End: 2024-07-11

## 2024-07-11 RX ORDER — METOCLOPRAMIDE HYDROCHLORIDE 5 MG/ML
INJECTION INTRAMUSCULAR; INTRAVENOUS AS NEEDED
Status: DISCONTINUED | OUTPATIENT
Start: 2024-07-11 | End: 2024-07-11

## 2024-07-11 RX ORDER — ONDANSETRON HYDROCHLORIDE 2 MG/ML
4 INJECTION, SOLUTION INTRAVENOUS ONCE AS NEEDED
Status: DISCONTINUED | OUTPATIENT
Start: 2024-07-11 | End: 2024-07-11 | Stop reason: HOSPADM

## 2024-07-11 RX ORDER — SODIUM CHLORIDE, SODIUM LACTATE, POTASSIUM CHLORIDE, CALCIUM CHLORIDE 600; 310; 30; 20 MG/100ML; MG/100ML; MG/100ML; MG/100ML
100 INJECTION, SOLUTION INTRAVENOUS CONTINUOUS
Status: DISCONTINUED | OUTPATIENT
Start: 2024-07-11 | End: 2024-07-11 | Stop reason: HOSPADM

## 2024-07-11 RX ORDER — SCOLOPAMINE TRANSDERMAL SYSTEM 1 MG/1
1 PATCH, EXTENDED RELEASE TRANSDERMAL
Status: DISCONTINUED | OUTPATIENT
Start: 2024-07-11 | End: 2024-07-11 | Stop reason: HOSPADM

## 2024-07-11 RX ORDER — IPRATROPIUM BROMIDE 0.5 MG/2.5ML
500 SOLUTION RESPIRATORY (INHALATION) ONCE
Status: DISCONTINUED | OUTPATIENT
Start: 2024-07-11 | End: 2024-07-11 | Stop reason: HOSPADM

## 2024-07-11 RX ORDER — LIDOCAINE HCL/PF 100 MG/5ML
SYRINGE (ML) INTRAVENOUS AS NEEDED
Status: DISCONTINUED | OUTPATIENT
Start: 2024-07-11 | End: 2024-07-11

## 2024-07-11 RX ORDER — DIPHENHYDRAMINE HYDROCHLORIDE 50 MG/ML
12.5 INJECTION INTRAMUSCULAR; INTRAVENOUS ONCE AS NEEDED
Status: DISCONTINUED | OUTPATIENT
Start: 2024-07-11 | End: 2024-07-11 | Stop reason: HOSPADM

## 2024-07-11 RX ORDER — DEXAMETHASONE SODIUM PHOSPHATE 10 MG/ML
6 INJECTION INTRAMUSCULAR; INTRAVENOUS ONCE
Status: DISCONTINUED | OUTPATIENT
Start: 2024-07-11 | End: 2024-07-11 | Stop reason: HOSPADM

## 2024-07-11 RX ORDER — SODIUM CHLORIDE 0.9 G/100ML
IRRIGANT IRRIGATION AS NEEDED
Status: DISCONTINUED | OUTPATIENT
Start: 2024-07-11 | End: 2024-07-11 | Stop reason: HOSPADM

## 2024-07-11 RX ORDER — ONDANSETRON HYDROCHLORIDE 2 MG/ML
4 INJECTION, SOLUTION INTRAVENOUS ONCE AS NEEDED
Status: COMPLETED | OUTPATIENT
Start: 2024-07-11 | End: 2024-07-11

## 2024-07-11 RX ORDER — KETOROLAC TROMETHAMINE 30 MG/ML
30 INJECTION, SOLUTION INTRAMUSCULAR; INTRAVENOUS ONCE AS NEEDED
Status: DISCONTINUED | OUTPATIENT
Start: 2024-07-11 | End: 2024-07-11 | Stop reason: HOSPADM

## 2024-07-11 RX ORDER — BUPIVACAINE HCL/EPINEPHRINE 0.25-.0005
VIAL (ML) INJECTION AS NEEDED
Status: DISCONTINUED | OUTPATIENT
Start: 2024-07-11 | End: 2024-07-11 | Stop reason: HOSPADM

## 2024-07-11 RX ORDER — KETOROLAC TROMETHAMINE 30 MG/ML
INJECTION, SOLUTION INTRAMUSCULAR; INTRAVENOUS AS NEEDED
Status: DISCONTINUED | OUTPATIENT
Start: 2024-07-11 | End: 2024-07-11

## 2024-07-11 ASSESSMENT — COLUMBIA-SUICIDE SEVERITY RATING SCALE - C-SSRS
2. HAVE YOU ACTUALLY HAD ANY THOUGHTS OF KILLING YOURSELF?: NO
6. HAVE YOU EVER DONE ANYTHING, STARTED TO DO ANYTHING, OR PREPARED TO DO ANYTHING TO END YOUR LIFE?: NO
1. IN THE PAST MONTH, HAVE YOU WISHED YOU WERE DEAD OR WISHED YOU COULD GO TO SLEEP AND NOT WAKE UP?: NO

## 2024-07-11 ASSESSMENT — PAIN SCALES - GENERAL
PAINLEVEL_OUTOF10: 0 - NO PAIN
PAIN_LEVEL: 0
PAINLEVEL_OUTOF10: 0 - NO PAIN

## 2024-07-11 ASSESSMENT — PAIN - FUNCTIONAL ASSESSMENT
PAIN_FUNCTIONAL_ASSESSMENT: VAS (VISUAL ANALOG SCALE)
PAIN_FUNCTIONAL_ASSESSMENT: 0-10

## 2024-07-11 NOTE — OP NOTE
RIGHT KNEE ARTHROSCOPY WITH PARTIAL MEDIAL MENISCECTOMY (R) Operative Note     Date: 2024  OR Location: [facility]    Name: Génesis Asencio, : 1975, Age: 49 y.o., MRN: [id number], Sex: female    Diagnosis  Pre-op Diagnosis      * Complex tear of medial meniscus of right knee as current injury, initial encounter [E35.369A] Post-op Diagnosis     * Complex tear of medial meniscus of right knee as current injury, initial encounter [S83.231A]     Procedures  RIGHT KNEE ARTHROSCOPY WITH PARTIAL MEDIAL MENISCECTOMY  21424 - ID ARTHRS KNE SURG W/MENISCECTOMY MED/LAT W/SHVG      Surgeons      * Guy Wong - Primary    Resident/Fellow/Other Assistant:    Chintan Wang    Procedure Summary  Anesthesia: General, Monitor Anesthesia Care  ASA: III  Anesthesia Staff: Anesthesiologist: Kenny Reeves MD  CRNA: ERIC Combs-CRNA  Estimated Blood Loss: 2 mL    Indications: 49-year-old with right knee pain and MRI demonstrating medial meniscus tear.  Treatment options clued no treatment reviewed and the decision was made to proceed with surgery.    Description of procedure: Patient was brought into the operating room and general anesthesia was performed.  Under sterile conditions knee was injected with Marcaine and Astramorph.  She was positioned and prepped and draped in usual fashion.  Anterolateral and anteromedial arthroscopy portals were used and arthroscopic examination the joint is performed.  There was grade III chondromalacia involving the median ridge of the patella.  There was grade IV chondromalacia generalized over the middle of the trochlea.  No loose bodies in the medial and lateral gutters.  The medial compartment there was grade IV chondromalacia over the posterior medial femoral condyle.  Grade II-III chondromalacia over the tibial plateau.  There is complex tear involving the posterior horn of the medial meniscus with a radial type tear at the meniscal root.  In the notch the ACL and PCL  were intact.  Lateral compartment there was grade II chondromalacia involving the tibial plateau.  Lateral meniscus was carefully probed and visualized and was intact.  There was mild fraying of the inner edge.  There was grade I chondromalacia on the posterior lateral femoral condyle.  Attention was turned to the medial compartment.  Using combination of the shaver and punches partial medial meniscectomy was performed till a smooth and stable edge was obtained.  Chondroplasty was done along the articular surfaces.  Remaining meniscus was probed and visualized and was intact.  Chondroplasty was done along the articular surfaces in the patellofemoral compartment as needed to provide a stable cartilage edge.  The knee was well irrigated.  Additional local anesthetic was injected.  The instruments removed Steri-Strips and a sterile dressing were applied and she was taken recovery in stable condition.    Intra-op Medications:   Administrations occurring from 1130 to 1300 on 07/11/24:   Medication Name Total Dose   sodium chloride 0.9 % irrigation solution 3,500 mL   BUPivacaine-EPINEPHrine (Marcaine w/EPI) 0.25 %-1:200,000 injection 80 mL   lactated Ringer's infusion 65 mL   ceFAZolin (Ancef) 2 g in dextrose (iso)  mL 2 g              Anesthesia Record               Intraprocedure I/O Totals          Intake    lactated Ringer's infusion 1000.00 mL    Total Intake 1000 mL       Output    Est. Blood Loss 10 mL    Total Output 10 mL       Net    Net Volume 990 mL          Specimen: No specimens collected     Staff:   Circulator: Leena Mcguire Person: Jd Hansen Circulator: Sharmin  Attending Attestation: I performed the procedure.    Guy Wong  Phone Number: [phone]

## 2024-07-11 NOTE — ANESTHESIA POSTPROCEDURE EVALUATION
Patient: Génesis Asencio    Procedure Summary       Date: 07/11/24 Room / Location: [facility] / [facility]    Anesthesia Start: 1136 Anesthesia Stop: 1236    Procedure: RIGHT KNEE ARTHROSCOPY WITH PARTIAL MEDIAL MENISCECTOMY (Right: Knee) Diagnosis:       Complex tear of medial meniscus of right knee as current injury, initial encounter      (Complex tear of medial meniscus of right knee as current injury, initial encounter [S83.231A])    Surgeons: Guy Wong MD Responsible Provider: Kenny Reeves MD    Anesthesia Type: general, MAC ASA Status: 3            Anesthesia Type: general, MAC    Vitals Value Taken Time   /67 07/11/24 1234   Temp 36 07/11/24 1236   Pulse 102 07/11/24 1234   Resp 14 07/11/24 1236   SpO2 96 % 07/11/24 1234   Vitals shown include unfiled device data.    Anesthesia Post Evaluation    Patient location during evaluation: PACU  Patient participation: complete - patient participated  Level of consciousness: awake and alert  Pain score: 0  Pain management: adequate  Multimodal analgesia pain management approach  Airway patency: patent  Cardiovascular status: acceptable  Respiratory status: acceptable  Hydration status: acceptable  Postoperative Nausea and Vomiting: none        No notable events documented.

## 2024-07-23 ENCOUNTER — APPOINTMENT (OUTPATIENT)
Dept: ORTHOPEDIC SURGERY | Facility: CLINIC | Age: 49
End: 2024-07-23
Payer: COMMERCIAL

## 2024-07-23 DIAGNOSIS — S83.231D COMPLEX TEAR OF MEDIAL MENISCUS, CURRENT INJURY, RIGHT KNEE, SUBSEQUENT ENCOUNTER: ICD-10-CM

## 2024-07-23 PROCEDURE — 99024 POSTOP FOLLOW-UP VISIT: CPT | Performed by: ORTHOPAEDIC SURGERY

## 2024-07-23 NOTE — PROGRESS NOTES
Seen today following knee arthroscopy. Making progress and having less pain.    The portals are healed without evidence of infection. There is a mild effusion. Range of motion is 0-110°.    The arthroscopic pictures were reviewed. Precautions were reviewed. Physical therapy will be started. Follow-up if symptomatic in 6 weeks.  Discussion about arthritis was done.  If she is symptomatic in 6 to 8 weeks then she would likely benefit from a cortisone injection.  She will use Aleve as needed.

## 2024-08-07 ENCOUNTER — APPOINTMENT (OUTPATIENT)
Dept: PHYSICAL THERAPY | Facility: CLINIC | Age: 49
End: 2024-08-07
Payer: COMMERCIAL

## 2024-08-21 ENCOUNTER — EVALUATION (OUTPATIENT)
Dept: PHYSICAL THERAPY | Facility: CLINIC | Age: 49
End: 2024-08-21
Payer: COMMERCIAL

## 2024-08-21 DIAGNOSIS — S83.231D COMPLEX TEAR OF MEDIAL MENISCUS, CURRENT INJURY, RIGHT KNEE, SUBSEQUENT ENCOUNTER: ICD-10-CM

## 2024-08-21 PROCEDURE — 97161 PT EVAL LOW COMPLEX 20 MIN: CPT | Mod: GP

## 2024-08-21 PROCEDURE — 97110 THERAPEUTIC EXERCISES: CPT | Mod: GP

## 2024-08-21 ASSESSMENT — PROMIS GLOBAL HEALTH SCALE
RATE_GENERAL_HEALTH: GOOD
RATE_AVERAGE_PAIN: 4
CARRYOUT_PHYSICAL_ACTIVITIES: MODERATELY
CARRYOUT_SOCIAL_ACTIVITIES: GOOD
RATE_SOCIAL_SATISFACTION: GOOD
RATE_MENTAL_HEALTH: GOOD
RATE_AVERAGE_FATIGUE: SEVERE
RATE_QUALITY_OF_LIFE: GOOD
RATE_PHYSICAL_HEALTH: GOOD
EMOTIONAL_PROBLEMS: RARELY

## 2024-08-21 ASSESSMENT — COLUMBIA-SUICIDE SEVERITY RATING SCALE - C-SSRS
6. HAVE YOU EVER DONE ANYTHING, STARTED TO DO ANYTHING, OR PREPARED TO DO ANYTHING TO END YOUR LIFE?: NO
1. IN THE PAST MONTH, HAVE YOU WISHED YOU WERE DEAD OR WISHED YOU COULD GO TO SLEEP AND NOT WAKE UP?: NO
2. HAVE YOU ACTUALLY HAD ANY THOUGHTS OF KILLING YOURSELF?: NO

## 2024-08-21 NOTE — PROGRESS NOTES
Physical Therapy Evaluation    Patient Name Génesis Asencio  MRN: [id number]  Today's Date: 8/21/2024  Time Calculation  Start Time: 1120  Stop Time: 1200  Time Calculation (min): 40 min    Insurance: Payor: [employer] [facility] / Plan: [employer] / Product Type: *No Product type* / VISITS ARE MED NEC NO AUTH NEEDED PAYS % OOP MET   -authorization required: no  Next MD appointment: none  Visit # 1    Problem List Items Addressed This Visit             ICD-10-CM    Complex tear of medial meniscus, current injury, right knee, subsequent encounter S83.231D    Relevant Orders    Follow Up In Physical Therapy       Onset Date: DOS 7/11/24  Referring Provider: Guy Wong MD    PT Orders: eval and treat  Date of Last Surgery: 7/11/2024  Procedure: Right Knee Arthroscopy With Partial Medial Meniscectomy - Right  Post Op Days: 41     Assessment:    Impression/Clinical Presentation:  Génesis Asencio  is a 49 y.o. old patient who participated in a physical therapy evaluation today due to s/p R medial menisectomy.     Génesis presents with signs and symptoms consistent with dec ROM strength/function s/p R medial menisectomy. Pt also having inc L knee/hip pain likely due to compensated gait and inc demand on LLE. Génesis's impairments include: balance deficits, gait deficits, pain, decreased strength, decreased range of motion, and decreased functional mobility.       Due to these impairments, she has the following functional limitations and participation restrictions: difficulty with ambulation, difficulty with stair negotiation, and difficulty performing occupational activities.     Skilled PT   Skilled physical therapy services are appropriate and beneficial in order to achieve measurable and meaningful change in the objective tests and measures. Utilization of skilled physical therapy services will aid in advancing her functional status and attaining her  therapy-related goals.     Problem List:  -activity/functional limitations  -Pain R knee  -decreased  ROM  -decreased strength   -decreased flexibility  -decreased knowledge of HEP  -balance    Goals:  STG 2 wks  Compliant with HEP  Dec pain 25%    LTG by discharge  I HEP  Improve functional outcome score to indicate improved functional mobility  Dec pain R knee % on pain scale with transition of movement  Inc AROM R knee WNL with improved car transfers  Inc RLE strength 5/5 with improved ability to get up from floor  Inc LE flexibility WNL  Reciprocal stairs  WNL gait   Improve SLS 15 sec or > and reports of improved stability with standing    Rehab Potential:  good    Clinical Presentation:    stable/and/or uncomplicated characteristics                                            Level of Complexity: low    Plan:    Therapeutic exercise, Gait training, Home program instruction and progression, Neuromuscular re-education, Therapeutic activities, Self care and home management, Instruction in activity modification, Electrical stimulation, Vasopneumatic device + cold, and Cryotherapy  Nustep for soft tissue warmup, ROM/strengthening LE, LE flexibility, CKC activities, gait/stair training, CP/game ready, balance activities      1 x week  until goals met or maximum rehab potential met  Pt is currently scheduled for 6 weeks    Plan of care was designed with input and agreement by the patient    Subjective:    Current Episode of Functional Impairment and/or Pain :  Chief complaint/HPI:  Chronic knee pain B and hx of patella dislocations as a teenager  In Jan 2024 pain escalated  Had a cortisone injection with no relief  Recent scope medial menisectomy 7/11/24    Pain  Pain assessment 0-10  Pain score: unable to identify  Pain location: R knee   (also has L knee/hip pain)  Type: achy    Exacerbating Factors: sit to stand, prolonged sitting  Relieving Factors: rest    Current Medical management:     PMHx: Reviewed  medical history form with patient and medical screening assessed.   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    Objective:    Ortho:  AROM knee   L knee 4-125    Strength   RLE  knee ext: 4-          flex: 4-  hip flex: 4-        abd: 3+      flexibility:  hamstrings: B end range tightness  TFL: R min tight    Special Tests  patellar mobility: WNL  patellar compression: -  patellar tracking: poor QS    Palpation: mild edema  Healed incisions    Gait: I amb w/o device, antalgic gait  Step to pattern-> difficulty with descending    SLS 5 sec w/o UE support  L 15 sec w/o UE support    Outcome Measures:  Other Measures  Lower Extremity Funtional Score (LEFS): 27     Treatment:    PT Evaluation Time Entry  PT Evaluation (Low) Time Entry: 25  minutes    Therapeutic Exercise:                             15 minutes  QS with towel under knee for muscle activation 10x  Adductor sets 10x  Hooklying hip abduction green 10x  Sidelying hip abduction 10x  SLR 10x                     Response to treatment: improved knowledge and understanding of condition  Génesis verbalized understanding and is in agreement with all goals and plan of care.  Génesis left session with all questions answered and no increase in symptoms.      Education: Educated on relevant anatomy and expected plan of care  Instructed in initial HEP including reasoning of given exercises and issued written instructions

## 2024-08-27 ENCOUNTER — APPOINTMENT (OUTPATIENT)
Dept: PHYSICAL THERAPY | Facility: CLINIC | Age: 49
End: 2024-08-27
Payer: COMMERCIAL

## 2024-08-28 ENCOUNTER — APPOINTMENT (OUTPATIENT)
Dept: PRIMARY CARE | Facility: CLINIC | Age: 49
End: 2024-08-28
Payer: COMMERCIAL

## 2024-08-28 ENCOUNTER — APPOINTMENT (OUTPATIENT)
Dept: PHYSICAL THERAPY | Facility: CLINIC | Age: 49
End: 2024-08-28
Payer: COMMERCIAL

## 2024-08-28 VITALS
WEIGHT: 246 LBS | HEIGHT: 64 IN | HEART RATE: 73 BPM | SYSTOLIC BLOOD PRESSURE: 122 MMHG | DIASTOLIC BLOOD PRESSURE: 78 MMHG | BODY MASS INDEX: 42 KG/M2 | OXYGEN SATURATION: 98 % | TEMPERATURE: 97.5 F

## 2024-08-28 DIAGNOSIS — E66.01 CLASS 3 SEVERE OBESITY DUE TO EXCESS CALORIES WITHOUT SERIOUS COMORBIDITY WITH BODY MASS INDEX (BMI) OF 40.0 TO 44.9 IN ADULT (MULTI): ICD-10-CM

## 2024-08-28 DIAGNOSIS — Z00.00 HEALTH MAINTENANCE EXAMINATION: Primary | ICD-10-CM

## 2024-08-28 LAB
ALBUMIN SERPL BCP-MCNC: 4.5 G/DL (ref 3.4–5)
ALP SERPL-CCNC: 110 U/L (ref 33–110)
ALT SERPL W P-5'-P-CCNC: 28 U/L (ref 7–45)
ANION GAP SERPL CALC-SCNC: 15 MMOL/L (ref 10–20)
AST SERPL W P-5'-P-CCNC: 16 U/L (ref 9–39)
BILIRUB SERPL-MCNC: 0.5 MG/DL (ref 0–1.2)
BUN SERPL-MCNC: 12 MG/DL (ref 6–23)
CALCIUM SERPL-MCNC: 9.7 MG/DL (ref 8.6–10.6)
CHLORIDE SERPL-SCNC: 104 MMOL/L (ref 98–107)
CHOLEST SERPL-MCNC: 207 MG/DL (ref 0–199)
CHOLESTEROL/HDL RATIO: 5.2
CO2 SERPL-SCNC: 22 MMOL/L (ref 21–32)
CREAT SERPL-MCNC: 0.53 MG/DL (ref 0.5–1.05)
EGFRCR SERPLBLD CKD-EPI 2021: >90 ML/MIN/1.73M*2
ERYTHROCYTE [DISTWIDTH] IN BLOOD BY AUTOMATED COUNT: 14.3 % (ref 11.5–14.5)
GLUCOSE SERPL-MCNC: 106 MG/DL (ref 74–99)
HCT VFR BLD AUTO: 39.5 % (ref 36–46)
HDLC SERPL-MCNC: 39.5 MG/DL
HGB BLD-MCNC: 12.5 G/DL (ref 12–16)
LDLC SERPL CALC-MCNC: 151 MG/DL
MCH RBC QN AUTO: 26.9 PG (ref 26–34)
MCHC RBC AUTO-ENTMCNC: 31.6 G/DL (ref 32–36)
MCV RBC AUTO: 85 FL (ref 80–100)
NON HDL CHOLESTEROL: 168 MG/DL (ref 0–149)
NRBC BLD-RTO: 0 /100 WBCS (ref 0–0)
PLATELET # BLD AUTO: 259 X10*3/UL (ref 150–450)
POTASSIUM SERPL-SCNC: 4.1 MMOL/L (ref 3.5–5.3)
PROT SERPL-MCNC: 7.3 G/DL (ref 6.4–8.2)
RBC # BLD AUTO: 4.64 X10*6/UL (ref 4–5.2)
SODIUM SERPL-SCNC: 137 MMOL/L (ref 136–145)
TRIGL SERPL-MCNC: 83 MG/DL (ref 0–149)
TSH SERPL-ACNC: 1.67 MIU/L (ref 0.44–3.98)
VLDL: 17 MG/DL (ref 0–40)
WBC # BLD AUTO: 9 X10*3/UL (ref 4.4–11.3)

## 2024-08-28 PROCEDURE — 1036F TOBACCO NON-USER: CPT | Performed by: FAMILY MEDICINE

## 2024-08-28 PROCEDURE — 80053 COMPREHEN METABOLIC PANEL: CPT

## 2024-08-28 PROCEDURE — 84443 ASSAY THYROID STIM HORMONE: CPT

## 2024-08-28 PROCEDURE — 85027 COMPLETE CBC AUTOMATED: CPT

## 2024-08-28 PROCEDURE — 80061 LIPID PANEL: CPT

## 2024-08-28 PROCEDURE — 99396 PREV VISIT EST AGE 40-64: CPT | Performed by: FAMILY MEDICINE

## 2024-08-28 PROCEDURE — 3008F BODY MASS INDEX DOCD: CPT | Performed by: FAMILY MEDICINE

## 2024-08-28 ASSESSMENT — ENCOUNTER SYMPTOMS
DEPRESSION: 0
LOSS OF SENSATION IN FEET: 0
OCCASIONAL FEELINGS OF UNSTEADINESS: 0

## 2024-08-28 ASSESSMENT — PAIN SCALES - GENERAL: PAINLEVEL: 0-NO PAIN

## 2024-08-28 NOTE — ASSESSMENT & PLAN NOTE
Recommended screening guidelines addressed and orders placed as indicated by age and chronic conditions  Screening labs ordered, will call with results  Discussed mammogram, colon cancer screening and PAP smear; pt aware of recommendations but declines  Continue to work on healthy lifestyle including well balanced diet, regular activity, limit alcohol, no tobacco products and safe sexual practices  Follow up annually

## 2024-08-28 NOTE — PROGRESS NOTES
"Reason for Visit: Annual Physical Exam    HPI:  Presents for wellness exam  Had meniscal repair done in July with Dr. Wong, about to start PT  Worried about weight, feels like gaining, not very active usually  Feels tired but not sleeping well, wakes up to urinate once a night and then hard to fall asleep    Active Problem List  Patient Active Problem List   Diagnosis    Vitamin D deficiency    Mass of upper outer quadrant of left breast    Health maintenance examination    Current tear of semilunar cartilage    Class 3 severe obesity in adult (Multi)    Complex tear of medial meniscus, current injury, right knee, subsequent encounter       Comprehensive Medical/Surgical/Social/Family History  Past Medical History:   Diagnosis Date    Acute non-recurrent maxillary sinusitis 03/17/2023    Arthritis     Irritable bowel syndrome      No past surgical history on file.  Social History     Tobacco Use    Smoking status: Never     Passive exposure: Never    Smokeless tobacco: Never   Vaping Use    Vaping status: Never Used   Substance Use Topics    Alcohol use: Never    Drug use: Never     Family History   Problem Relation Name Age of Onset    Esophageal cancer Mother      Colon cancer Father      Colon cancer Maternal Grandmother      Diabetes Maternal Grandfather           Allergies and Medications  Patient has no known allergies.  No current outpatient medications on file prior to visit.     No current facility-administered medications on file prior to visit.         ROS otherwise negative aside from what was mentioned above in HPI.    Vitals  /78 (BP Location: Left arm, Patient Position: Sitting, BP Cuff Size: Large adult)   Pulse 73   Temp 36.4 °C (97.5 °F) (Temporal)   Ht 1.626 m (5' 4\")   Wt 112 kg (246 lb)   LMP 07/28/2024   SpO2 98%   BMI 42.23 kg/m²   Body mass index is 42.23 kg/m².  Physical Exam  Gen: Alert, NAD  HEENT:  PERRL, EOMI, conjunctiva and sclera normal in appearance. External auditory " canals/TMs normal; Oral cavity and posterior pharynx without lesions/exudate  Neck:  Supple with FROM; No masses/nodes palpable; Thyroid nontender and without nodules; No RHONDA  Respiratory:  Lungs CTAB  Cardiovascular:  Heart RRR. No M/R/G. Peripheral pulses equal bilaterally  Abdomen:  Soft, nontender, No R/G/R; No HSM or masses palpated  Extremities:  FROM all extremities; Muscle strength grossly normal with good tone  Neuro:  CN II-XII intact; Gross motor and sensory intact  Skin:  No suspicious lesions present    Assessment and Plan:  Problem List Items Addressed This Visit       Health maintenance examination - Primary    Current Assessment & Plan     Recommended screening guidelines addressed and orders placed as indicated by age and chronic conditions  Screening labs ordered, will call with results  Discussed mammogram, colon cancer screening and PAP smear; pt aware of recommendations but declines  Continue to work on healthy lifestyle including well balanced diet, regular activity, limit alcohol, no tobacco products and safe sexual practices  Follow up annually           Relevant Orders    CBC    Comprehensive Metabolic Panel    Lipid Panel    TSH with reflex to Free T4 if abnormal    Class 3 severe obesity in adult (Multi)    Current Assessment & Plan     Lifestyle modification discussed at length with focus on improving diet and increasing activity levels                Genia Ramachandran MD

## 2024-09-04 ENCOUNTER — TREATMENT (OUTPATIENT)
Dept: PHYSICAL THERAPY | Facility: CLINIC | Age: 49
End: 2024-09-04
Payer: COMMERCIAL

## 2024-09-04 DIAGNOSIS — S83.231D COMPLEX TEAR OF MEDIAL MENISCUS, CURRENT INJURY, RIGHT KNEE, SUBSEQUENT ENCOUNTER: ICD-10-CM

## 2024-09-04 PROCEDURE — 97110 THERAPEUTIC EXERCISES: CPT | Mod: GP

## 2024-09-04 PROCEDURE — 97112 NEUROMUSCULAR REEDUCATION: CPT | Mod: GP

## 2024-09-04 NOTE — PROGRESS NOTES
Physical Therapy Treatment Note      Patient Name Génesis Asencio  MRN: [id number]  Today's Date: 9/4/2024  Time Calculation  Start Time: 1115  Stop Time: 1155  Time Calculation (min): 40 min    Insurance: Payor: [employer] [facility] / Plan: [employer] / Product Type: *No Product type* / VISITS ARE MED NEC NO AUTH NEEDED PAYS % OOP MET   Visit #: 2  Date of Onset: DOS 7/11/24   -authorization required: no    General:  Next MD appt: Guy Wong MD none  Date of Last Surgery: 7/11/2024  Procedure: Right Knee Arthroscopy With Partial Medial Meniscectomy - Right  Post-Op Days: 55     Problem List Items Addressed This Visit             ICD-10-CM    Complex tear of medial meniscus, current injury, right knee, subsequent encounter S83.231D       Assessment:  skilled intervention: exercise progression for strength/function    Patient would continue to benefit from skilled PT to address remaining functional, objective and subjective deficits to allow them to return to full independence with ADLs     Progressed with inc ROM and in CKC with good tolerance  Challenged with sit to stand exercise and unable to complete w/o some UE support    Plan:  Shuttle leg press and calf raises    Precautions: no fall risk    Subjective:  General:  Cont to have L knee and hip pain as well    Functional Progress:  Feels unstable with transition of movement after prolonged sitting  Car transfers improving    Pain  Pain assessment 0-10  Pain score: 3  Pain location: R knee    Compliant with HEP:  yes    Understanding of HEP: WNL    Objective:  Therapeutic Exercise  30 minutes  see below  **indicates new exercises or progression  NP=not performed    Neuromuscular Re-education:  10 minutes  See below  **indicates new exercises or progression  NP=not performed      Other     Exercise Log:  AROM R knee 7-109  Nustep L2 5' **  Slantboard stretch 10 sec 10x **  Calf  raises 10x2 **  SLS airex 10 sec 10x **    QS with towel under knee for muscle activation 10x   Adductor sets 10x2   Hooklying hip abduction green 10x  Sidelying hip abduction 10x2  1# **  SLR 10x2  1# **  Seated hamstring curl green 10x2 **  LAQ  1# 10x2 **  Supine hamstring stretch strap 10 sec 10x **  Sit to stand from mat table hands on thighs (lowest) 10x2 **    Education:  Instructed in progression of exercises

## 2024-09-11 ENCOUNTER — APPOINTMENT (OUTPATIENT)
Dept: PHYSICAL THERAPY | Facility: CLINIC | Age: 49
End: 2024-09-11
Payer: COMMERCIAL

## 2024-09-18 ENCOUNTER — TREATMENT (OUTPATIENT)
Dept: PHYSICAL THERAPY | Facility: CLINIC | Age: 49
End: 2024-09-18
Payer: COMMERCIAL

## 2024-09-18 DIAGNOSIS — S83.231D COMPLEX TEAR OF MEDIAL MENISCUS, CURRENT INJURY, RIGHT KNEE, SUBSEQUENT ENCOUNTER: ICD-10-CM

## 2024-09-18 PROCEDURE — 97110 THERAPEUTIC EXERCISES: CPT | Mod: GP

## 2024-09-18 PROCEDURE — 97112 NEUROMUSCULAR REEDUCATION: CPT | Mod: GP

## 2024-09-18 NOTE — PROGRESS NOTES
Physical Therapy Treatment Note      Patient Name Génesis Asencio  MRN: [id number]  Today's Date: 9/18/2024  Time Calculation  Start Time: 0945  Stop Time: 1025  Time Calculation (min): 40 min    Insurance: Payor: [employer] [facility] / Plan: [employer] / Product Type: *No Product type* / VISITS ARE MED NEC NO AUTH NEEDED PAYS % OOP MET   Visit #: 3  Date of Onset: DOS 7/11/24   -authorization required: no    General:  Next MD appt:  Guy Wong MD none   Date of Last Surgery: 7/11/2024  Procedure: Right Knee Arthroscopy With Partial Medial Meniscectomy - Right  Post-Op Days: 69     Problem List Items Addressed This Visit             ICD-10-CM    Complex tear of medial meniscus, current injury, right knee, subsequent encounter S83.231D       Assessment:  skilled intervention: exercise progression for strength    Patient would continue to benefit from skilled PT to address remaining functional, objective and subjective deficits to allow them to return to full independence with ADLs     Progressed with strengthening on shuttle  ROM improving    Plan:  Step exercises    Precautions: no fall risk    Subjective:  General:  No more pain than usual  Cont with mild R hip pain    Functional Progress:  Still challenged with sit to stand after prolonged sitting    Pain  Pain assessment 0-10  Pain score: 2  Pain location: R knee    Compliant with HEP:  yes    Understanding of HEP: WNL    Objective:  Therapeutic Exercise  30 minutes  see below  **indicates new exercises or progression  NP=not performed    Neuromuscular Re-education:  10 minutes  See below  **indicates new exercises or progression  NP=not performed    Other     Exercise Log:  AROM R knee 3-118  Nustep L2 5'   Slantboard stretch 10 sec 10x   Calf raises 10x2 airex  **  SLS airex 10 sec 10x  airex **     QS with towel under knee for muscle activation 10x   Adductor sets 10x2    Hooklying hip abduction green 10x2  Hooklying marching green 10x2 **  Sidelying hip abduction 10x2  1#   SLR 10x2  1#   Seated hamstring curl green 10x2   LAQ  1# 10x2   Supine hamstring stretch strap 10 sec 10x   Clamshells 10x2 **  Sit to stand from mat table hands on thighs (lowest) 10x2     Shuttle leg press BLE 50# 10x2**  Shuttle BLE calf raises 50# 10x2 **    Education:  Instructed in progression of exercises

## 2024-09-25 ENCOUNTER — APPOINTMENT (OUTPATIENT)
Dept: PHYSICAL THERAPY | Facility: CLINIC | Age: 49
End: 2024-09-25
Payer: COMMERCIAL

## 2024-10-02 ENCOUNTER — TREATMENT (OUTPATIENT)
Dept: PHYSICAL THERAPY | Facility: CLINIC | Age: 49
End: 2024-10-02
Payer: COMMERCIAL

## 2024-10-02 DIAGNOSIS — S83.231D COMPLEX TEAR OF MEDIAL MENISCUS, CURRENT INJURY, RIGHT KNEE, SUBSEQUENT ENCOUNTER: ICD-10-CM

## 2024-10-02 PROCEDURE — 97112 NEUROMUSCULAR REEDUCATION: CPT | Mod: GP

## 2024-10-02 PROCEDURE — 97110 THERAPEUTIC EXERCISES: CPT | Mod: GP

## 2024-10-02 NOTE — PROGRESS NOTES
Physical Therapy Treatment Note      Patient Name Génesis Asencio  MRN: [id number]  Today's Date: 10/2/2024  Time Calculation  Start Time: 1035  Stop Time: 1115  Time Calculation (min): 40 min    Insurance: Payor: [facility] / Plan: [employer] / Product Type: *No Product type* /  VISITS ARE MED NEC NO AUTH NEEDED PAYS % OOP MET   Visit #: 4  Date of Onset:  DOS 7/11/24   -authorization required: no    Problem List Items Addressed This Visit             ICD-10-CM    Complex tear of medial meniscus, current injury, right knee, subsequent encounter S83.231D       General:  Referred by: Guy Wong MD   Next MD appt: none  Date of Last Surgery: 7/11/2024  Procedure: Right Knee Arthroscopy With Partial Medial Meniscectomy - Right  Post-Op Days: 83     Assessment:  skilled intervention: exercise progression for strength/function    Patient would continue to benefit from skilled PT to address remaining functional, objective and subjective deficits to allow them to return to full independence with ADLs     Progressed in CKC with step exercises and initiated single leg strengthening on shuttle    Plan:  1 more visit then discharge to HEP    Precautions: no fall risk    Subjective:  General:  Getting better  Feels that she walks a lot easier    Functional Progress:  Sit to stand still difficult but was like this pre-operatively    Compliant with HEP:  yes    Understanding of HEP: WNL    Pain  Pain assessment 0-10  Pain score: 4  Pain location: R knee    Objective:  Therapeutic Exercise  30 minutes  see below  **indicates new exercises or progression  NP=not performed    Neuromuscular Re-education:  10 minutes  See below  **indicates new exercises or progression  NP=not performed      Other     Exercise Log:  AROM R knee 3-118  Nustep L2 5'   Slantboard stretch 10 sec 10x   Calf raises 10x2 airex    SLS airex 10 sec 10x  airex       QS with towel under knee for muscle activation 10x   Adductor sets 10x2   Hooklying hip abduction green 10x2  Hooklying marching green 10x2   Sidelying hip abduction 10x2  2# **  SLR 10x2  2# **  Seated hamstring curl green 10x2   LAQ  1# 10x2   Supine hamstring stretch strap 10 sec 10x   Clamshells 10x2   Sit to stand from mat table hands on thighs (lowest) 10x2      Shuttle leg press BLE 50# 10x  Shuttle BLE calf raises 50# 10x2   Shuttle RLE 10x2 leg press 25# **    Education:  Instructed in progression of exercises  Some cueing required for new exercises and shuttle calf raises

## 2024-10-09 ENCOUNTER — APPOINTMENT (OUTPATIENT)
Dept: PHYSICAL THERAPY | Facility: CLINIC | Age: 49
End: 2024-10-09
Payer: COMMERCIAL

## 2024-10-09 DIAGNOSIS — S83.231D COMPLEX TEAR OF MEDIAL MENISCUS, CURRENT INJURY, RIGHT KNEE, SUBSEQUENT ENCOUNTER: Primary | ICD-10-CM

## 2024-10-09 PROCEDURE — 97112 NEUROMUSCULAR REEDUCATION: CPT | Mod: GP

## 2024-10-09 PROCEDURE — 97110 THERAPEUTIC EXERCISES: CPT | Mod: GP

## 2024-10-09 NOTE — PROGRESS NOTES
Physical Therapy Treatment Note      Patient Name Génesis Asencio  MRN: [id number]  Today's Date: 10/9/2024  Time Calculation  Start Time: 0905  Stop Time: 0940  Time Calculation (min): 35 min    Insurance: Payor: [employer] [facility] / Plan: [employer] / Product Type: *No Product type* /  VISITS ARE MED NEC NO AUTH NEEDED PAYS % OOP MET   Visit #: 5  Date of Onset:  DOS 7/11/24   -authorization required: no    Problem List Items Addressed This Visit             ICD-10-CM    Complex tear of medial meniscus, current injury, right knee, subsequent encounter - Primary S83.231D         General:  Referred by: Guy Wong MD   Next MD appt: none  Date of Last Surgery: 7/11/2024  Procedure: Right Knee Arthroscopy With Partial Medial Meniscectomy - Right  Post-Op Days: 90      Precautions: no fall risk    Subjective:  General:  Cont to do well w/o pain today    Functional Progress:    Compliant with HEP:  yes    Understanding of HEP: WNL    Pain  Pain assessment 0-10  Pain score: 0  Pain location: R knee    Objective:  Therapeutic Exercise  25 minutes  see below  **indicates new exercises or progression  NP=not performed    Neuromuscular Re-education:  10 minutes  See below  **indicates new exercises or progression  NP=not performed      Outcome Measures:  Other Measures  Lower Extremity Funtional Score (LEFS): 54     Other   AROM knee   L knee 4-125     Strength   RLE  knee ext: 5-          flex: 5-  hip flex: 5-        abd: 5-      flexibility:  hamstrings: B WNL  TFL: R WNL     Special Tests  patellar mobility: WNL  patellar compression: -  patellar tracking: WNL      Gait: I amb w/o device  Reciprocal stairs    SLS 10 sec w/o UE support  L 15 sec w/o UE support    Exercise Log:  AROM R knee 3-128  Nustep L2 5'   Slantboard stretch 10 sec 10x   Calf raises 10x2 airex    SLS airex 10 sec 10x  airex      QS with towel under knee for  muscle activation 10x   Adductor sets 10x2   Hooklying hip abduction green 10x2  Hooklying marching green 10x2   Sidelying hip abduction 10x2  2#   SLR 10x2  2#   Seated hamstring curl green 10x2   LAQ  2# 10x2 **  Supine hamstring stretch strap 10 sec 10x   Clamshells 10x2   Sit to stand from mat table hands on thighs (lowest) 10x2      Shuttle leg press BLE 50# 10x  Shuttle BLE calf raises 50# 10x2   Shuttle RLE 10x2 leg press 25#     Education:  Review of HEP and progression    Assessment:  skilled intervention: education for HEP  Reasmt for report period 8/21/24-10/8/24    LTG  I HEP (met)  Improve functional outcome score to indicate improved functional mobility  Dec pain R knee % on pain scale with transition of movement(met)  Inc AROM R knee WNL with improved car transfers(met)  Inc RLE strength 5/5 with improved ability to get up from floor(met)  Inc LE flexibility WNL (met)  Reciprocal stairs (met)  WNL gait  (met)  Improve SLS 15 sec or > and reports of improved stability with standing    Plan:  Discharge to HEP for report period 8/21/24-10/8/24

## 2024-11-19 DIAGNOSIS — F51.01 PRIMARY INSOMNIA: Primary | ICD-10-CM

## 2024-11-19 RX ORDER — TRAZODONE HYDROCHLORIDE 50 MG/1
50 TABLET ORAL NIGHTLY PRN
Qty: 30 TABLET | Refills: 0 | Status: SHIPPED | OUTPATIENT
Start: 2024-11-19 | End: 2025-11-19

## 2024-12-17 DIAGNOSIS — F51.01 PRIMARY INSOMNIA: ICD-10-CM

## 2024-12-17 RX ORDER — TRAZODONE HYDROCHLORIDE 50 MG/1
50 TABLET ORAL NIGHTLY PRN
Qty: 30 TABLET | Refills: 0 | Status: SHIPPED | OUTPATIENT
Start: 2024-12-17

## 2025-01-20 DIAGNOSIS — F51.01 PRIMARY INSOMNIA: ICD-10-CM

## 2025-01-20 RX ORDER — TRAZODONE HYDROCHLORIDE 50 MG/1
50 TABLET ORAL NIGHTLY PRN
Qty: 30 TABLET | Refills: 0 | Status: SHIPPED | OUTPATIENT
Start: 2025-01-20

## 2025-01-25 DIAGNOSIS — F51.01 PRIMARY INSOMNIA: ICD-10-CM

## 2025-01-27 RX ORDER — TRAZODONE HYDROCHLORIDE 50 MG/1
50 TABLET ORAL NIGHTLY
Qty: 90 TABLET | Refills: 3 | Status: SHIPPED | OUTPATIENT
Start: 2025-01-27

## 2025-02-22 ENCOUNTER — APPOINTMENT (OUTPATIENT)
Dept: DERMATOLOGY | Facility: CLINIC | Age: 50
End: 2025-02-22
Payer: COMMERCIAL

## 2025-03-04 ENCOUNTER — APPOINTMENT (OUTPATIENT)
Dept: PRIMARY CARE | Facility: CLINIC | Age: 50
End: 2025-03-04
Payer: COMMERCIAL

## 2025-03-08 ENCOUNTER — APPOINTMENT (OUTPATIENT)
Dept: DERMATOLOGY | Facility: CLINIC | Age: 50
End: 2025-03-08
Payer: COMMERCIAL

## 2025-04-01 DIAGNOSIS — M25.562 ACUTE PAIN OF LEFT KNEE: Primary | ICD-10-CM

## 2025-04-03 ENCOUNTER — HOSPITAL ENCOUNTER (OUTPATIENT)
Dept: RADIOLOGY | Facility: CLINIC | Age: 50
Discharge: HOME | End: 2025-04-03
Payer: COMMERCIAL

## 2025-04-03 ENCOUNTER — OFFICE VISIT (OUTPATIENT)
Dept: ORTHOPEDIC SURGERY | Facility: CLINIC | Age: 50
End: 2025-04-03
Payer: COMMERCIAL

## 2025-04-03 VITALS — WEIGHT: 230 LBS | HEIGHT: 64 IN | BODY MASS INDEX: 39.27 KG/M2

## 2025-04-03 DIAGNOSIS — M25.562 ACUTE PAIN OF LEFT KNEE: ICD-10-CM

## 2025-04-03 DIAGNOSIS — M25.562 ACUTE PAIN OF LEFT KNEE: Primary | ICD-10-CM

## 2025-04-03 PROCEDURE — 99214 OFFICE O/P EST MOD 30 MIN: CPT

## 2025-04-03 PROCEDURE — 3008F BODY MASS INDEX DOCD: CPT

## 2025-04-03 PROCEDURE — 73564 X-RAY EXAM KNEE 4 OR MORE: CPT | Mod: LEFT SIDE | Performed by: RADIOLOGY

## 2025-04-03 PROCEDURE — 73564 X-RAY EXAM KNEE 4 OR MORE: CPT | Mod: LT

## 2025-04-03 PROCEDURE — 1036F TOBACCO NON-USER: CPT

## 2025-04-03 NOTE — PROGRESS NOTES
Subjective    Patient ID: Génesis Asencio is a 49 y.o. female.    Chief Complaint: Pain of the Left Knee (Pain for 3-4 weeks/No injury)     HPI  This is a pleasant 49-year-old female presenting to the office for evaluation of left knee pain, which has been ongoing for 3 to 4 weeks, worsening from an injury on Tuesday.  Patient states that she was at [employer], when she was walking down the steps, when she twisted her left knee and felt a pop.  She immediately felt mechanical symptoms of knee giving out and buckling.  She states that pain is focal to the medial aspect of left knee.  Pain is described as a sharp shooting stabbing pain.  Pain is constant.  She is having significant difficulty with any bending or squatting activities as well as applying her socks and shoes due to pain elicited in her knee.  She has noticed swelling and limited range of motion.  No redness or warmth.  She is walking with a limp due to the pain.  She states that pain feels identical to a previous meniscus tear that she had in her right knee.  Dr. Wong performed a right knee arthroscopy for her in July 2024, and she states that her right knee is doing very well.  She prefers not to take over-the-counter medications, will take an occasional Aleve without significant relief.  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    The patient's past medical, surgical, family, and social history as well as allergies and medications were reviewed and updated in the chart.    Objective   Ortho Exam  Pleasant in no acute distress.  Walks in the office today with an antalgic gait slightly limping.  Bilateral knees are appearing without soft tissue swelling erythema or ecchymosis.  There is no significant warmth upon touch.  Slight patellofemoral crepitus noted with range of motion testing.  Left knee range of motion is approximately 3 to 110 degrees.  The knee is stable to varus and valgus stress Lachman and posterior drawer.   Art's is positive with pain medially.  There is a moderate effusion.  There is localized medial joint line tenderness.  Right knee range of motion is 0 to 120 degrees without instability.  Both lower extremities are well-perfused and skin is intact.    Image Results:  Multiple view x-rays of the left knee obtained today personally reviewed, without evidence of acute fracture or dislocation.  There is evidence of mild degenerative changes with joint space narrowing in medial patellofemoral compartments.    Assessment/Plan   Encounter Diagnoses:  Acute pain of left knee, high concern for medial meniscus tear due to an injury sustained on Tuesday, April 1    Plan: Discussion with patient in regards to acute pain of left knee and injury.  We did review her x-rays today at length and there is no evidence of acute fracture or dislocation.  She had a twisting injury on Tuesday, felt a pop with severe pain and mechanical symptoms of knee giving out and buckling.  She has a positive Art's exam with focal medial joint line tenderness, therefore I am highly suspicious for a medial meniscus tear.  She should obtain an MRI of the left knee to assess for medial meniscus tear.  MRI would be used for presurgical planning.  Preferable to have MRI performed at a facility with a 1.5 Elaine magnet for presurgical planning.  Patient is aware that she may need follow-up with Dr. Wong to discuss knee arthroscopy.  In the meantime, patient will continue with over-the-counter medications, ice application and avoiding aggravating activities.  I will follow-up with patient after MRI is complete for further treatment options.    Orders Placed This Encounter    MR knee left wo IV contrast

## 2025-04-30 ENCOUNTER — APPOINTMENT (OUTPATIENT)
Dept: RADIOLOGY | Facility: HOSPITAL | Age: 50
End: 2025-04-30
Payer: COMMERCIAL

## 2025-05-07 ENCOUNTER — TELEPHONE (OUTPATIENT)
Dept: ORTHOPEDIC SURGERY | Facility: CLINIC | Age: 50
End: 2025-05-07
Payer: COMMERCIAL

## 2025-05-07 DIAGNOSIS — M25.562 ACUTE PAIN OF LEFT KNEE: Primary | ICD-10-CM

## 2025-05-15 LAB — 25(OH)D3+25(OH)D2 SERPL-MCNC: 12 NG/ML (ref 30–100)

## 2025-05-16 ENCOUNTER — APPOINTMENT (OUTPATIENT)
Dept: ORTHOPEDIC SURGERY | Facility: CLINIC | Age: 50
End: 2025-05-16
Payer: COMMERCIAL

## 2025-05-16 ENCOUNTER — OFFICE VISIT (OUTPATIENT)
Dept: ORTHOPEDIC SURGERY | Facility: CLINIC | Age: 50
End: 2025-05-16
Payer: COMMERCIAL

## 2025-05-16 VITALS — BODY MASS INDEX: 39.27 KG/M2 | HEIGHT: 64 IN | WEIGHT: 230 LBS

## 2025-05-16 DIAGNOSIS — S72.435A: Primary | ICD-10-CM

## 2025-05-16 DIAGNOSIS — E55.9 VITAMIN D DEFICIENCY: Primary | ICD-10-CM

## 2025-05-16 DIAGNOSIS — M25.562 ACUTE PAIN OF LEFT KNEE: ICD-10-CM

## 2025-05-16 PROCEDURE — 99213 OFFICE O/P EST LOW 20 MIN: CPT

## 2025-05-16 PROCEDURE — 1036F TOBACCO NON-USER: CPT

## 2025-05-16 PROCEDURE — 3008F BODY MASS INDEX DOCD: CPT

## 2025-05-16 RX ORDER — CHOLECALCIFEROL (VITAMIN D3) 1250 MCG
1.25 TABLET ORAL
Qty: 12 TABLET | Refills: 0 | Status: SHIPPED | OUTPATIENT
Start: 2025-05-18 | End: 2025-08-04

## 2025-05-17 NOTE — PROGRESS NOTES
Subjective    Patient ID: Génesis Asencio is a 49 y.o. female.    Chief Complaint: Pain and Follow-up of the Left Knee (MRI DISCUSSION)     HPI  This is a pleasant 49-year-old female presenting to the office for follow-up in regards to a left knee injury, which was sustained approximately 6 to 8 weeks ago.  Patient states that she was at [employer] when she was walking down the steps and she twisted her left knee and felt a pop.  I assessed the patient 3 to 4 weeks after this injury on April 3, 2025.  Patient was advised to obtain an MRI of the left knee to assess for a medial meniscus tear due to focal medial joint line pain and a positive Art's exam.  MRI of the left knee was obtained with evidence of a tear of the root of the posterior horn of the medial meniscus as well as a subchondral fracture to the weightbearing surface of the medial femoral condyle with surrounding edema.  There is also noted to be a ganglion cyst of the popliteus myotendinous junction.  I advised patient to follow-up in the office to discuss results.  Patient presents to the office today with continued complaints of medial left knee pain.  She continues to have some degree of pain when weightbearing on left lower extremity.  She had a right knee arthroscopy performed by Dr. Guy Wong in July 2024 and states right knee has been doing well.  She prefers not to take over-the-counter medications but continues to take an occasional Aleve with minimal relief of symptoms.  She has been elevating and icing.  She has been limiting weightbearing.  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    The patient's past medical, surgical, family, and social history as well as allergies and medications were reviewed and updated in the chart.    Objective   Ortho Exam  Pleasant in no acute distress. Walks in the office today with an antalgic gait slightly limping. Bilateral knees are appearing without soft tissue swelling  erythema or ecchymosis. There is no significant warmth upon touch. Slight patellofemoral crepitus noted with range of motion testing. Left knee range of motion is approximately 3 to 110 degrees. The knee is stable to varus and valgus stress Lachman and posterior drawer. Art's is positive with pain medially. There is a moderate effusion. There is localized medial joint line tenderness. Right knee range of motion is 0 to 120 degrees without instability. Both lower extremities are well-perfused and skin is intact.     Vitamin D level was obtained: 12    Image Results:  XR knee left 4+ views  Narrative: Interpreted By:  Genia Foy,   STUDY:  XR KNEE LEFT 4+ VIEWS;  4/3/2025 10:52 am      INDICATION:  Signs/Symptoms:left knee pain.      COMPARISON:  None.      ACCESSION NUMBER(S):  [id number]      ORDERING CLINICIAN:  DOROTA GARBER      FINDINGS:  Spurs are seen off of the distal femur and proximal tibia, including  the tibial spines.  Spurs are also seen off of the patella. Mild  narrowing of the medial compartment is seen.      No acute fracture or dislocation is noted.      No periosteal reaction is seen.      There is small suprapatellar effusion.      On the views obtained of bilateral knees, degenerative changes are  noted in the right knee.      Impression: Degenerative changes.          MACRO:  None      Signed by: Genia Foy 4/4/2025 5:57 PM  Dictation workstation:   [id number]    MRI of the left knee was obtained at [facility] radiology and report and imaging was assessed on a disk brought in by the patient.MRI of the left knee was obtained with evidence of a tear of the root of the posterior horn of the medial meniscus as well as a subchondral fracture to the weightbearing surface of the medial femoral condyle with surrounding edema.  There is also noted to be a ganglion cyst of the popliteus myotendinous junction.  MRI will be loaded into the  system.    Assessment/Plan   Encounter  Diagnoses:  Nondisplaced fracture of medial condyle of left femur, initial encounter for closed fracture    Acute pain of left knee    Plan: Discussion with patient in regards to subchondral fracture of the medial femoral condyle, as well as meniscus tear.  MRI was reviewed at length with the patient.  Explained to patient that treatment of the meniscus tear would not be done until complete healing of the stress fracture is done.  Explained that these type of stress fractures can take approximately 8 to 12 weeks to fully heal.  As we are not completely sure the exact date of her initial injury, we assume that she is somewhere in the 6 to 8 weeks of her healing process.  Advised patient to continue to limit weightbearing on left lower extremity.  She should continue with NSAIDs and Tylenol as needed.  She can apply ice.  She will use an economy hinged type style brace for her left knee.  Discussed the potential for possible knee arthroscopy in the future if patient does not see significant relief of symptoms after 12 weeks of healing. Discussed low Vitamin D level of 12. Patient was prescribe Vitamin D 50,000 units weekly for 12 weeks.  Patient was advised to follow-up in 4 weeks for reevaluation of the left knee with repeat x-rays of the left knee at that time.

## 2025-05-20 ENCOUNTER — APPOINTMENT (OUTPATIENT)
Dept: RADIOLOGY | Facility: HOSPITAL | Age: 50
End: 2025-05-20
Payer: COMMERCIAL

## 2025-05-22 ENCOUNTER — APPOINTMENT (OUTPATIENT)
Dept: ORTHOPEDIC SURGERY | Facility: CLINIC | Age: 50
End: 2025-05-22
Payer: COMMERCIAL

## 2025-05-27 ENCOUNTER — HOSPITAL ENCOUNTER (OUTPATIENT)
Dept: RADIOLOGY | Facility: EXTERNAL LOCATION | Age: 50
Discharge: HOME | End: 2025-05-27

## 2025-06-17 ENCOUNTER — OFFICE VISIT (OUTPATIENT)
Dept: ORTHOPEDIC SURGERY | Facility: CLINIC | Age: 50
End: 2025-06-17
Payer: COMMERCIAL

## 2025-06-17 ENCOUNTER — HOSPITAL ENCOUNTER (OUTPATIENT)
Dept: RADIOLOGY | Facility: CLINIC | Age: 50
Discharge: HOME | End: 2025-06-17
Payer: COMMERCIAL

## 2025-06-17 VITALS — BODY MASS INDEX: 39.27 KG/M2 | HEIGHT: 64 IN | WEIGHT: 230 LBS

## 2025-06-17 DIAGNOSIS — S83.232D COMPLEX TEAR OF MEDIAL MENISCUS OF LEFT KNEE, SUBSEQUENT ENCOUNTER: ICD-10-CM

## 2025-06-17 DIAGNOSIS — M25.562 ACUTE PAIN OF LEFT KNEE: ICD-10-CM

## 2025-06-17 DIAGNOSIS — S72.435A: ICD-10-CM

## 2025-06-17 DIAGNOSIS — S72.435A: Primary | ICD-10-CM

## 2025-06-17 PROCEDURE — 73562 X-RAY EXAM OF KNEE 3: CPT | Mod: LT

## 2025-06-17 PROCEDURE — 3008F BODY MASS INDEX DOCD: CPT

## 2025-06-17 PROCEDURE — 99213 OFFICE O/P EST LOW 20 MIN: CPT

## 2025-06-17 PROCEDURE — 1036F TOBACCO NON-USER: CPT

## 2025-06-17 PROCEDURE — 99212 OFFICE O/P EST SF 10 MIN: CPT

## 2025-06-19 NOTE — PROGRESS NOTES
Subjective    Patient ID: Génesis Asencio is a 50 y.o. female.    Chief Complaint: Follow-up of the Left Knee     HPI  Pleasant 50-year-old female presenting to the office for follow up in regards to left knee injury which was sustained approximately 10 to 12 weeks ago.  She was at [facility] when she was walking down steps when she twisted her left knee and felt a pop.  Initially assessed patient 3 to 4 weeks after her initial injury on April 3, 2025.  We obtained an MRI of the left knee at that time to assess for medial meniscus tear due to focal medial joint line pain and a positive Art's exam.  MRI of the left knee was obtained with evidence of a tear of the root of the posterior horn of the medial meniscus as well as a subchondral fracture to the weightbearing surface of the medial femoral condyle with surrounding edema.  There was also noted to be a ganglion cyst of the popliteal myotendinous junction.  Patient states that symptoms are slowly improving, but the pain she feels now to the medial aspect of her knee, resembles the pain she felt when she had her meniscus tear of the right knee.  She had a right knee arthroscopy performed by Dr. Guy Wong in July 2024 and has been doing well in regards to the right knee.  She points directly to the medial aspect of her knee when describing the discomfort.  Pain is described as a constant dull ache, sharp shooting at times.  Pain does inhibit her activity living including prolonged standing walking and stair climbing.  She will take an occasional Aleve with minimal relief of symptoms.  She continues to ice and elevate.  She has been limiting weightbearing.  We did draw a vitamin D level previously that was 12 and she has started a weekly vitamin D supplement.  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    The patient's past medical, surgical, family, and social history as well as allergies and medications were reviewed and updated in the  chart.    Objective   Ortho Exam  Pleasant in no acute distress.  Walks in the office today with a mildly antalgic gait.  Bilateral knees appearing without soft tissue swelling erythema or ecchymosis.  There is no warmth upon touch of the left knee.  Slight patellofemoral crepitus no with range of motion testing.  Left knee range of motion is approximately 3 to 115 degrees today.  The knee is stable to varus and valgus stress.  Art's remains positive with pain medially.  There is localized medial joint line tenderness.  There is a mild effusion.  Right knee range of motion is 0 to 115 degrees without instability.  Bilateral lower extremities are well-perfused and skin is intact.    Image Results:  XR knee left 3 views  Narrative: Interpreted By:  Donavan Bright,   STUDY:  XR KNEE LEFT 3 VIEWS; ;  6/17/2025 1:24 pm      INDICATION:  Signs/Symptoms:left knee stress fx.      ,S72.435A Nondisplaced fracture of medial condyle of left femur,  initial encounter for closed fracture      COMPARISON:  None.      ACCESSION NUMBER(S):  [id number]      ORDERING CLINICIAN:  DOROTA GARBER      FINDINGS:  Left knee, three views with two views of the right knee for comparison      There is mild joint space narrowing with osteophytosis in the medial  compartments bilaterally with mild genu varum. No effusion or  fracture seen      Impression: Mild bilateral knee osteoarthritis medially with genu varum          MACRO:  None      Signed by: Donavan Bright 6/18/2025 6:55 PM  Dictation workstation:   [id number]    Multiple view x-rays of the left knee obtained today personally reviewed, without evidence of compression of the bone to left medial femoral condyle or patient has compression fracture.  There was noted to be mild bilateral knee arthritis most notable in the medial compartment of each knee.    Assessment/Plan   Encounter Diagnoses:  Nondisplaced fracture of medial condyle of left femur, initial encounter for closed  fracture    Acute pain of left knee    Complex tear of medial meniscus of left knee, subsequent encounter    Plan: Discussion with patient in regards to healing subchondral fracture of the medial femoral condyle as well as meniscus tear.  Patient states she feels better weightbearing on left lower extremity, but continues to have pain focal to the medial aspect of her knee, which reminds her of the pain she felt with her right knee meniscus tear.  Explained to patient that treatment of the meniscus tear would not be done until complete healing of the stress fracture was done.  We are estimating that she is about 10 to 12 weeks out from her initial injury, as we are not sure of the exact date of injury.  Advised patient to continue to limit weightbearing on left lower extremity. She can continue to take occasional NSAIDs and Tylenol.  She can apply ice and elevate.  She still uses economy hinged brace for her left knee.  She is continuing with vitamin D supplement of 50,000 units weekly for 12 weeks as her vitamin D level was low at 12. I advised follow-up with Dr. Wong in 4 to 6 weeks if she continues to be symptomatic for possible discussion of knee arthroscopy if he believes is indicated.    Orders Placed This Encounter    XR knee left 3 views     No follow-ups on file.

## 2025-07-07 RX ORDER — ALBUTEROL SULFATE 90 UG/1
2 INHALANT RESPIRATORY (INHALATION) EVERY 6 HOURS PRN
COMMUNITY
Start: 2025-01-23

## 2025-07-07 RX ORDER — ASPIRIN 325 MG
1.25 TABLET, DELAYED RELEASE (ENTERIC COATED) ORAL
COMMUNITY
Start: 2025-05-16

## 2025-07-07 RX ORDER — HYDROCORTISONE 25 MG/G
CREAM TOPICAL
COMMUNITY
Start: 2025-04-09

## 2025-07-07 RX ORDER — METRONIDAZOLE 10 MG/G
GEL TOPICAL
COMMUNITY
Start: 2025-05-23

## 2025-07-09 ASSESSMENT — PROMIS GLOBAL HEALTH SCALE
RATE_AVERAGE_PAIN: 4
RATE_AVERAGE_FATIGUE: SEVERE
RATE_GENERAL_HEALTH: GOOD
CARRYOUT_SOCIAL_ACTIVITIES: GOOD
RATE_MENTAL_HEALTH: GOOD
RATE_QUALITY_OF_LIFE: FAIR
RATE_PHYSICAL_HEALTH: GOOD
RATE_SOCIAL_SATISFACTION: GOOD
EMOTIONAL_PROBLEMS: SOMETIMES
CARRYOUT_PHYSICAL_ACTIVITIES: MODERATELY

## 2025-07-11 ENCOUNTER — APPOINTMENT (OUTPATIENT)
Dept: PRIMARY CARE | Facility: CLINIC | Age: 50
End: 2025-07-11
Payer: COMMERCIAL

## 2025-07-11 VITALS
HEART RATE: 85 BPM | WEIGHT: 248.6 LBS | TEMPERATURE: 97.5 F | SYSTOLIC BLOOD PRESSURE: 132 MMHG | DIASTOLIC BLOOD PRESSURE: 78 MMHG | HEIGHT: 64 IN | BODY MASS INDEX: 42.44 KG/M2 | OXYGEN SATURATION: 99 %

## 2025-07-11 DIAGNOSIS — R53.83 OTHER FATIGUE: ICD-10-CM

## 2025-07-11 DIAGNOSIS — R73.09 ELEVATED GLUCOSE: ICD-10-CM

## 2025-07-11 DIAGNOSIS — Z00.00 HEALTH MAINTENANCE EXAMINATION: Primary | ICD-10-CM

## 2025-07-11 PROBLEM — N63.21 MASS OF UPPER OUTER QUADRANT OF LEFT BREAST: Status: RESOLVED | Noted: 2023-03-17 | Resolved: 2025-07-11

## 2025-07-11 PROCEDURE — 3008F BODY MASS INDEX DOCD: CPT | Performed by: FAMILY MEDICINE

## 2025-07-11 PROCEDURE — 99396 PREV VISIT EST AGE 40-64: CPT | Performed by: FAMILY MEDICINE

## 2025-07-11 PROCEDURE — 1036F TOBACCO NON-USER: CPT | Performed by: FAMILY MEDICINE

## 2025-07-11 ASSESSMENT — ENCOUNTER SYMPTOMS
OCCASIONAL FEELINGS OF UNSTEADINESS: 0
DEPRESSION: 0
LOSS OF SENSATION IN FEET: 0

## 2025-07-11 NOTE — ASSESSMENT & PLAN NOTE
Recommended screening guidelines addressed and orders placed as indicated by age and chronic conditions  Screening labs ordered, will call with results  Declines mammogram, colon cancer screening, PAP smear- informed and aware of risks  Continue to work on healthy lifestyle including well balanced diet, regular activity, limit alcohol, no tobacco products   Follow up annually

## 2025-07-11 NOTE — PROGRESS NOTES
"Reason for Visit: Annual Physical Exam    HPI:  Presents for wellness visit  Recently injured left knee, likely will need surgery, has follow up with Dr. Wong.  Had surgery on right knee last year and that is doing well.    Declines screening tests.    Dealing with some periemenopausal symptoms such as fatigue, trouble sleeping. Trazodone ok for sleep.    Active Problem List  Problem List[1]    Comprehensive Medical/Surgical/Social/Family History  Medical History[2]  Surgical History[3]  Social History[4]  Family History[5]      Allergies and Medications  Patient has no known allergies.  Medications Ordered Prior to Encounter[6]      ROS otherwise negative aside from what was mentioned above in HPI.    Vitals  /78 (BP Location: Left arm, Patient Position: Sitting, BP Cuff Size: Large adult)   Pulse 85   Temp 36.4 °C (97.5 °F) (Temporal)   Ht 1.626 m (5' 4\")   Wt 113 kg (248 lb 9.6 oz)   LMP 07/11/2025 (Exact Date)   SpO2 99%   BMI 42.67 kg/m²   Body mass index is 42.67 kg/m².  Physical Exam  Gen: Alert, NAD  HEENT:  PERRL, EOMI, conjunctiva and sclera normal in appearance. External auditory canals/TMs normal; Oral cavity and posterior pharynx without lesions/exudate  Neck:  Supple with FROM; No masses/nodes palpable; Thyroid nontender and without nodules; No RHONDA  Respiratory:  Lungs CTAB  Cardiovascular:  Heart RRR. No M/R/G. Peripheral pulses equal bilaterally  Abdomen:  Soft, nontender, No R/G/R; No HSM or masses palpated  Extremities:  FROM all extremities; Muscle strength grossly normal with good tone  Neuro:  CN II-XII intact;  Gross motor and sensory intact  Skin:  No suspicious lesions present    Assessment and Plan:  Problem List Items Addressed This Visit       Health maintenance examination - Primary    Current Assessment & Plan   Recommended screening guidelines addressed and orders placed as indicated by age and chronic conditions  Screening labs ordered, will call with results  Declines " mammogram, colon cancer screening, PAP smear- informed and aware of risks  Continue to work on healthy lifestyle including well balanced diet, regular activity, limit alcohol, no tobacco products   Follow up annually           Relevant Orders    CBC    Comprehensive Metabolic Panel    Hemoglobin A1C    Lipid Panel     Other Visit Diagnoses         Elevated glucose        Relevant Orders    Hemoglobin A1C    TSH with reflex to Free T4 if abnormal      Other fatigue        Relevant Orders    TSH with reflex to Free T4 if abnormal              Genia Ramachandran MD         [1]   Patient Active Problem List  Diagnosis    Vitamin D deficiency    Health maintenance examination    Current tear of semilunar cartilage    Class 3 severe obesity in adult    Complex tear of medial meniscus, current injury, right knee, subsequent encounter   [2]   Past Medical History:  Diagnosis Date    Acute non-recurrent maxillary sinusitis 03/17/2023    ADHD (attention deficit hyperactivity disorder) 1983    Anemia 1993    Anxiety 2000    Arthritis 1999    Irritable bowel syndrome     Mass of upper outer quadrant of left breast 03/17/2023    Urinary tract infection 2006    Varicella 1978    Visual impairment    [3] History reviewed. No pertinent surgical history.  [4]   Social History  Tobacco Use    Smoking status: Never     Passive exposure: Never    Smokeless tobacco: Never   Vaping Use    Vaping status: Never Used   Substance Use Topics    Alcohol use: Never    Drug use: Never   [5]   Family History  Problem Relation Name Age of Onset    Esophageal cancer Mother      Colon cancer Father      Colon cancer Maternal Grandmother      Diabetes Maternal Grandfather      COPD Mother Yesica Field     Depression Mother Yesica Field     Cancer Mother Yesica Field     Mental illness Mother Yesica Field     Cancer Father Orlando Field     COPD Maternal Grandmother Maral José Miguel     Cancer Maternal Grandfather Lv Valdez     Stroke Maternal  Grandfather Lv Valdez     Colon cancer Maternal Grandfather Lv Valdez     Diabetes Mother's Sister Franchesca Bias     Intellectual Disability Daughter Diane     Birth defects Daughter Diane     Learning disabilities Daughter Diane     COPD Mother's Sister Kaylah Bias     Miscarriages / Stillbirths Mother's Sister Kaylah Bias     COPD Mother's Sister Kristel Bias     Miscarriages / Stillbirths Mother's Sister Kristel Bias     Depression Daughter Monalisa     Mental illness Daughter Monalisa     Depression Son Harlan     Mental illness Son Harlan     Miscarriages / Stillbirths Mother's Sister Franchesca Bias    [6]   Current Outpatient Medications on File Prior to Visit   Medication Sig Dispense Refill    cholecalciferol (Vitamin D-3) 1.25 mg (50,000 units) tablet Take 1 tablet (1.25 mg) by mouth 1 (one) time per week for 12 doses. 12 tablet 0    metroNIDAZOLE (Metrogel) 1 % gel APPLY TO THE AFFECTED AREA ON NOSE TWICE DAILY      traZODone (Desyrel) 50 mg tablet Take 1 tablet (50 mg) by mouth once daily at bedtime. 90 tablet 3    albuterol 90 mcg/actuation inhaler Inhale 2 puffs every 6 hours if needed for wheezing. (Patient not taking: Reported on 7/11/2025)      cholecalciferol (Vitamin D-3) 1.25 mg (50,000 units) capsule Take 1 capsule (1.25 mg) by mouth 1 (one) time per week. (Patient not taking: Reported on 7/11/2025)      hydrocortisone 2.5 % cream  (Patient not taking: Reported on 7/11/2025)      sodium chloride (Ocean) 0.65 % nasal spray Administer 1 spray into affected nostril(s) if needed. (Patient not taking: Reported on 7/11/2025)       No current facility-administered medications on file prior to visit.

## 2025-07-12 LAB
ALBUMIN SERPL-MCNC: 4.3 G/DL (ref 3.6–5.1)
ALP SERPL-CCNC: 102 U/L (ref 31–125)
ALT SERPL-CCNC: 24 U/L (ref 6–29)
ANION GAP SERPL CALCULATED.4IONS-SCNC: 7 MMOL/L (CALC) (ref 7–17)
AST SERPL-CCNC: 17 U/L (ref 10–35)
BILIRUB SERPL-MCNC: 0.5 MG/DL (ref 0.2–1.2)
BUN SERPL-MCNC: 8 MG/DL (ref 7–25)
CALCIUM SERPL-MCNC: 9.1 MG/DL (ref 8.6–10.2)
CHLORIDE SERPL-SCNC: 106 MMOL/L (ref 98–110)
CHOLEST SERPL-MCNC: 197 MG/DL
CHOLEST/HDLC SERPL: 5.6 (CALC)
CO2 SERPL-SCNC: 24 MMOL/L (ref 20–32)
CREAT SERPL-MCNC: 0.54 MG/DL (ref 0.5–0.99)
EGFRCR SERPLBLD CKD-EPI 2021: 113 ML/MIN/1.73M2
ERYTHROCYTE [DISTWIDTH] IN BLOOD BY AUTOMATED COUNT: 14.8 % (ref 11–15)
EST. AVERAGE GLUCOSE BLD GHB EST-MCNC: 120 MG/DL
EST. AVERAGE GLUCOSE BLD GHB EST-SCNC: 6.6 MMOL/L
GLUCOSE SERPL-MCNC: 105 MG/DL (ref 65–99)
HBA1C MFR BLD: 5.8 %
HCT VFR BLD AUTO: 39.1 % (ref 35–45)
HDLC SERPL-MCNC: 35 MG/DL
HGB BLD-MCNC: 12.4 G/DL (ref 11.7–15.5)
LDLC SERPL CALC-MCNC: 138 MG/DL (CALC)
MCH RBC QN AUTO: 26.3 PG (ref 27–33)
MCHC RBC AUTO-ENTMCNC: 31.7 G/DL (ref 32–36)
MCV RBC AUTO: 83 FL (ref 80–100)
NONHDLC SERPL-MCNC: 162 MG/DL (CALC)
PLATELET # BLD AUTO: 248 THOUSAND/UL (ref 140–400)
PMV BLD REES-ECKER: 12.1 FL (ref 7.5–12.5)
POTASSIUM SERPL-SCNC: 4.1 MMOL/L (ref 3.5–5.3)
PROT SERPL-MCNC: 7 G/DL (ref 6.1–8.1)
RBC # BLD AUTO: 4.71 MILLION/UL (ref 3.8–5.1)
SODIUM SERPL-SCNC: 137 MMOL/L (ref 135–146)
TRIGL SERPL-MCNC: 119 MG/DL
TSH SERPL-ACNC: 1.76 MIU/L
WBC # BLD AUTO: 7.8 THOUSAND/UL (ref 3.8–10.8)

## 2025-07-22 ENCOUNTER — OFFICE VISIT (OUTPATIENT)
Dept: ORTHOPEDIC SURGERY | Facility: CLINIC | Age: 50
End: 2025-07-22
Payer: COMMERCIAL

## 2025-07-22 VITALS — BODY MASS INDEX: 39.27 KG/M2 | WEIGHT: 230 LBS | HEIGHT: 64 IN

## 2025-07-22 DIAGNOSIS — S83.232A COMPLEX TEAR OF MEDIAL MENISCUS OF LEFT KNEE AS CURRENT INJURY, INITIAL ENCOUNTER: Primary | ICD-10-CM

## 2025-07-22 PROCEDURE — 99214 OFFICE O/P EST MOD 30 MIN: CPT | Performed by: ORTHOPAEDIC SURGERY

## 2025-07-22 PROCEDURE — 3008F BODY MASS INDEX DOCD: CPT | Performed by: ORTHOPAEDIC SURGERY

## 2025-07-22 PROCEDURE — 1036F TOBACCO NON-USER: CPT | Performed by: ORTHOPAEDIC SURGERY

## 2025-07-22 PROCEDURE — 99212 OFFICE O/P EST SF 10 MIN: CPT | Performed by: ORTHOPAEDIC SURGERY

## 2025-07-22 RX ORDER — CEFAZOLIN SODIUM 2 G/100ML
2 INJECTION, SOLUTION INTRAVENOUS ONCE
OUTPATIENT
Start: 2025-07-22 | End: 2025-07-22

## 2025-07-22 NOTE — LETTER
July 22, 2025     Genia Ramachandran MD  [address]  [facility] [address]    Patient: Génesis Asencio   YOB: 1975   Date of Visit: 7/22/2025       Dear Dr. Genia Ramachandran MD:    Thank you for referring Génesis Asencio to me for evaluation. Below are my notes for this consultation.  If you have questions, please do not hesitate to call me. I look forward to following your patient along with you.       Sincerely,     Guy Wong MD      CC: No Recipients  ______________________________________________________________________________________    50-year-old is seen with left knee pain.  She has been having persistent severe sharp shooting pain in the left knee.  Pain is worse with standing and walking and going up and down stairs and getting up and down from a chair in and out of a car.  She injured the knee April 3, 2025.  She has been having locking swelling and giving way.  She has done well with the right knee arthroscopy and partial medial meniscectomy.  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 Ambulates with a mildly antalgic gait.  Left knee range of motion is 3-120. There is a mild effusion and no instability. The knee is stable to varus and valgus stress Lachman and posterior drawer. There is medial joint line tenderness and  Art’s is positive with pain medially.  Right knee range of motion is 0-130 without effusion or instability. There is no tenderness around the right knee. Bilateral lower extremities are well-perfused the skin is intact and muscle tone is adequate.  There is adequate range of motion of his hips without significant pain.    Multiple x-ray views of the left knee are personally reviewed and there is no acute bony abnormality.  Joint spaces are maintained.    MRI of the left knee is personally reviewed and there is complex tear involving the posterior horn of the medial meniscus with predominantly a radial  component.  There are mild chondral changes involving the medial compartment.  There is subchondral fracture with bone marrow edema involving the medial femoral condyle.    A detailed discussion about the medial meniscus tear and chondral change was done.  Treatment options including no treatment were reviewed and the decision was made to proceed with left knee arthroscopy and partial medial meniscectomy.  The surgery and postoperative course were reviewed in detail.  Risks including but not limited to infection thromboembolus neurovascular joint medical problems stiffness and limitations of arthroscopy were discussed and she understands this and has elected to proceed.  Limitations in the setting of arthritis were reviewed.  She had similar findings in her other knee and responded well to knee arthroscopy and continues to do well.  She will avoid aggravating activities in the meantime.

## 2025-07-22 NOTE — PROGRESS NOTES
50-year-old is seen with left knee pain.  She has been having persistent severe sharp shooting pain in the left knee.  Pain is worse with standing and walking and going up and down stairs and getting up and down from a chair in and out of a car.  She injured the knee April 3, 2025.  She has been having locking swelling and giving way.  She has done well with the right knee arthroscopy and partial medial meniscectomy.  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 Ambulates with a mildly antalgic gait.  Left knee range of motion is 3-120. There is a mild effusion and no instability. The knee is stable to varus and valgus stress Lachman and posterior drawer. There is medial joint line tenderness and  Art’s is positive with pain medially.  Right knee range of motion is 0-130 without effusion or instability. There is no tenderness around the right knee. Bilateral lower extremities are well-perfused the skin is intact and muscle tone is adequate.  There is adequate range of motion of his hips without significant pain.    Multiple x-ray views of the left knee are personally reviewed and there is no acute bony abnormality.  Joint spaces are maintained.    MRI of the left knee is personally reviewed and there is complex tear involving the posterior horn of the medial meniscus with predominantly a radial component.  There are mild chondral changes involving the medial compartment.  There is subchondral fracture with bone marrow edema involving the medial femoral condyle.    A detailed discussion about the medial meniscus tear and chondral change was done.  Treatment options including no treatment were reviewed and the decision was made to proceed with left knee arthroscopy and partial medial meniscectomy.  The surgery and postoperative course were reviewed in detail.  Risks including but not limited to infection thromboembolus neurovascular joint medical problems  stiffness and limitations of arthroscopy were discussed and she understands this and has elected to proceed.  Limitations in the setting of arthritis were reviewed.  She had similar findings in her other knee and responded well to knee arthroscopy and continues to do well.  She will avoid aggravating activities in the meantime.

## 2025-08-05 DIAGNOSIS — Z12.31 ENCOUNTER FOR SCREENING MAMMOGRAM FOR BREAST CANCER: ICD-10-CM

## (undated) DEVICE — STRIP, SKIN CLOSURE, STERI STRIP, REINFORCED, 0.5 X 4 IN

## (undated) DEVICE — SLEEVE, VASO PRESS, CALF GARMENT, MEDIUM, GREEN

## (undated) DEVICE — NEEDLE, HYPODERMIC, SAFETYGLIDE, SHIELDING, REGULAR WALL, REGULAR BEVEL, 22 G X 1.5 IN, BLACK HUB

## (undated) DEVICE — APPLICATOR, CHLORAPREP, W/ORANGE TINT, 26ML

## (undated) DEVICE — SYRINGE, 50 CC, LUER LOCK

## (undated) DEVICE — BANDAGE, ELASTIC, PREMIUM, SELF-CLOSE, 4 IN X 5.5 YD, STERILE

## (undated) DEVICE — NEEDLE, HYPODERMIC, SAFETY, GLIDE, 18G X 1.5"

## (undated) DEVICE — TUBING, PUMP MAIN 16FT STERILE

## (undated) DEVICE — DRESSING, ABDOMINAL, TENDERSORB, 8 X 7-1/2 IN, STERILE

## (undated) DEVICE — Device

## (undated) DEVICE — BLADE, STRYKER, 4.0MM, RESECTOR, F-SERIES

## (undated) DEVICE — DRESSING, GAUZE, PETROLATUM, PATCH, XEROFORM, 1 X 8 IN, STERILE

## (undated) DEVICE — BANDAGE, ELASTIC, PREMIUM, SELF-CLOSE, 6 IN X 5.5 YD, STERILE